# Patient Record
Sex: MALE | Race: WHITE | Employment: FULL TIME | URBAN - METROPOLITAN AREA
[De-identification: names, ages, dates, MRNs, and addresses within clinical notes are randomized per-mention and may not be internally consistent; named-entity substitution may affect disease eponyms.]

---

## 2017-09-14 ENCOUNTER — APPOINTMENT (OUTPATIENT)
Dept: GENERAL RADIOLOGY | Age: 52
DRG: 282 | End: 2017-09-14
Payer: COMMERCIAL

## 2017-09-14 ENCOUNTER — HOSPITAL ENCOUNTER (INPATIENT)
Age: 52
LOS: 1 days | Discharge: ANOTHER HOSPITAL | DRG: 282 | End: 2017-09-15
Attending: EMERGENCY MEDICINE | Admitting: INTERNAL MEDICINE
Payer: COMMERCIAL

## 2017-09-14 DIAGNOSIS — R07.9 CHEST PAIN, UNSPECIFIED TYPE: Primary | ICD-10-CM

## 2017-09-14 LAB
ABSOLUTE EOS #: 0.1 K/UL (ref 0–0.4)
ABSOLUTE LYMPH #: 2.5 K/UL (ref 1–4.8)
ABSOLUTE MONO #: 0.4 K/UL (ref 0.1–1.3)
ANION GAP SERPL CALCULATED.3IONS-SCNC: 15 MMOL/L (ref 9–17)
BASOPHILS # BLD: 1 %
BASOPHILS ABSOLUTE: 0 K/UL (ref 0–0.2)
BUN BLDV-MCNC: 16 MG/DL (ref 6–20)
BUN/CREAT BLD: ABNORMAL (ref 9–20)
CALCIUM SERPL-MCNC: 10 MG/DL (ref 8.6–10.4)
CHLORIDE BLD-SCNC: 102 MMOL/L (ref 98–107)
CO2: 26 MMOL/L (ref 20–31)
CREAT SERPL-MCNC: 0.96 MG/DL (ref 0.7–1.2)
DIFFERENTIAL TYPE: NORMAL
EOSINOPHILS RELATIVE PERCENT: 1 %
GFR AFRICAN AMERICAN: >60 ML/MIN
GFR NON-AFRICAN AMERICAN: >60 ML/MIN
GFR SERPL CREATININE-BSD FRML MDRD: ABNORMAL ML/MIN/{1.73_M2}
GFR SERPL CREATININE-BSD FRML MDRD: ABNORMAL ML/MIN/{1.73_M2}
GLUCOSE BLD-MCNC: 102 MG/DL (ref 70–99)
HCT VFR BLD CALC: 47.1 % (ref 41–53)
HEMOGLOBIN: 16 G/DL (ref 13.5–17.5)
INR BLD: 1
LYMPHOCYTES # BLD: 30 %
MCH RBC QN AUTO: 30.3 PG (ref 26–34)
MCHC RBC AUTO-ENTMCNC: 34 G/DL (ref 31–37)
MCV RBC AUTO: 89.2 FL (ref 80–100)
MONOCYTES # BLD: 5 %
PDW BLD-RTO: 13.2 % (ref 11.5–14.9)
PLATELET # BLD: 205 K/UL (ref 150–450)
PLATELET ESTIMATE: NORMAL
PMV BLD AUTO: 8.7 FL (ref 6–12)
POTASSIUM SERPL-SCNC: 3.9 MMOL/L (ref 3.7–5.3)
PROTHROMBIN TIME: 10.5 SEC (ref 9.7–12)
RBC # BLD: 5.28 M/UL (ref 4.5–5.9)
RBC # BLD: NORMAL 10*6/UL
SEG NEUTROPHILS: 63 %
SEGMENTED NEUTROPHILS ABSOLUTE COUNT: 5.4 K/UL (ref 1.3–9.1)
SODIUM BLD-SCNC: 143 MMOL/L (ref 135–144)
TROPONIN INTERP: NORMAL
TROPONIN T: <0.03 NG/ML
WBC # BLD: 8.5 K/UL (ref 3.5–11)
WBC # BLD: NORMAL 10*3/UL

## 2017-09-14 PROCEDURE — 80048 BASIC METABOLIC PNL TOTAL CA: CPT

## 2017-09-14 PROCEDURE — 71010 XR CHEST PORTABLE: CPT

## 2017-09-14 PROCEDURE — 85025 COMPLETE CBC W/AUTO DIFF WBC: CPT

## 2017-09-14 PROCEDURE — 36415 COLL VENOUS BLD VENIPUNCTURE: CPT

## 2017-09-14 PROCEDURE — 84484 ASSAY OF TROPONIN QUANT: CPT

## 2017-09-14 PROCEDURE — 99285 EMERGENCY DEPT VISIT HI MDM: CPT

## 2017-09-14 PROCEDURE — 6370000000 HC RX 637 (ALT 250 FOR IP): Performed by: EMERGENCY MEDICINE

## 2017-09-14 PROCEDURE — 85610 PROTHROMBIN TIME: CPT

## 2017-09-14 PROCEDURE — 93005 ELECTROCARDIOGRAM TRACING: CPT

## 2017-09-14 RX ORDER — ASPIRIN 81 MG/1
324 TABLET, CHEWABLE ORAL ONCE
Status: COMPLETED | OUTPATIENT
Start: 2017-09-14 | End: 2017-09-14

## 2017-09-14 RX ADMIN — ASPIRIN 81 MG 324 MG: 81 TABLET ORAL at 23:22

## 2017-09-14 ASSESSMENT — PAIN DESCRIPTION - PAIN TYPE: TYPE: ACUTE PAIN

## 2017-09-14 ASSESSMENT — PAIN SCALES - GENERAL: PAINLEVEL_OUTOF10: 0

## 2017-09-14 ASSESSMENT — PAIN DESCRIPTION - FREQUENCY: FREQUENCY: INTERMITTENT

## 2017-09-14 ASSESSMENT — PAIN DESCRIPTION - DESCRIPTORS: DESCRIPTORS: DISCOMFORT

## 2017-09-14 ASSESSMENT — PAIN DESCRIPTION - LOCATION: LOCATION: CHEST

## 2017-09-14 ASSESSMENT — PAIN DESCRIPTION - ORIENTATION: ORIENTATION: ANTERIOR;MID

## 2017-09-15 ENCOUNTER — APPOINTMENT (OUTPATIENT)
Dept: NUCLEAR MEDICINE | Age: 52
DRG: 282 | End: 2017-09-15
Payer: COMMERCIAL

## 2017-09-15 ENCOUNTER — HOSPITAL ENCOUNTER (OUTPATIENT)
Dept: CARDIAC CATH/INVASIVE PROCEDURES | Age: 52
Discharge: HOME | End: 2017-09-15
Payer: COMMERCIAL

## 2017-09-15 VITALS
RESPIRATION RATE: 17 BRPM | OXYGEN SATURATION: 96 % | BODY MASS INDEX: 29.54 KG/M2 | DIASTOLIC BLOOD PRESSURE: 67 MMHG | TEMPERATURE: 98.2 F | SYSTOLIC BLOOD PRESSURE: 103 MMHG | WEIGHT: 211 LBS | HEIGHT: 71 IN | HEART RATE: 53 BPM

## 2017-09-15 VITALS
DIASTOLIC BLOOD PRESSURE: 86 MMHG | OXYGEN SATURATION: 98 % | BODY MASS INDEX: 29.54 KG/M2 | SYSTOLIC BLOOD PRESSURE: 145 MMHG | HEART RATE: 57 BPM | TEMPERATURE: 97.7 F | HEIGHT: 71 IN | WEIGHT: 211 LBS | RESPIRATION RATE: 18 BRPM

## 2017-09-15 PROBLEM — R07.9 CHEST PAIN: Status: ACTIVE | Noted: 2017-09-15

## 2017-09-15 LAB
ABSOLUTE EOS #: 0.2 K/UL (ref 0–0.4)
ABSOLUTE LYMPH #: 2.9 K/UL (ref 1–4.8)
ABSOLUTE MONO #: 0.5 K/UL (ref 0.1–1.3)
ANION GAP SERPL CALCULATED.3IONS-SCNC: 10 MMOL/L (ref 9–17)
BASOPHILS # BLD: 1 %
BASOPHILS ABSOLUTE: 0.1 K/UL (ref 0–0.2)
BNP INTERPRETATION: NORMAL
BUN BLDV-MCNC: 19 MG/DL (ref 6–20)
BUN/CREAT BLD: ABNORMAL (ref 9–20)
CALCIUM SERPL-MCNC: 9.1 MG/DL (ref 8.6–10.4)
CHLORIDE BLD-SCNC: 106 MMOL/L (ref 98–107)
CHOLESTEROL/HDL RATIO: 4.8
CHOLESTEROL: 173 MG/DL
CO2: 27 MMOL/L (ref 20–31)
CREAT SERPL-MCNC: 0.97 MG/DL (ref 0.7–1.2)
DIFFERENTIAL TYPE: NORMAL
EOSINOPHILS RELATIVE PERCENT: 3 %
ESTIMATED AVERAGE GLUCOSE: 103 MG/DL
GFR AFRICAN AMERICAN: >60 ML/MIN
GFR NON-AFRICAN AMERICAN: >60 ML/MIN
GFR SERPL CREATININE-BSD FRML MDRD: ABNORMAL ML/MIN/{1.73_M2}
GFR SERPL CREATININE-BSD FRML MDRD: ABNORMAL ML/MIN/{1.73_M2}
GLUCOSE BLD-MCNC: 106 MG/DL (ref 70–99)
HBA1C MFR BLD: 5.2 % (ref 4–6)
HCT VFR BLD CALC: 43.1 % (ref 41–53)
HDLC SERPL-MCNC: 36 MG/DL
HEMOGLOBIN: 14.4 G/DL (ref 13.5–17.5)
LDL CHOLESTEROL: 75 MG/DL (ref 0–130)
LV EF: 44 %
LV EF: 45 %
LVEF MODALITY: NORMAL
LVEF MODALITY: NORMAL
LYMPHOCYTES # BLD: 38 %
MAGNESIUM: 2.3 MG/DL (ref 1.6–2.6)
MCH RBC QN AUTO: 29.8 PG (ref 26–34)
MCHC RBC AUTO-ENTMCNC: 33.4 G/DL (ref 31–37)
MCV RBC AUTO: 89.5 FL (ref 80–100)
MONOCYTES # BLD: 7 %
PDW BLD-RTO: 13.3 % (ref 11.5–14.9)
PLATELET # BLD: 170 K/UL (ref 150–450)
PLATELET ESTIMATE: NORMAL
PMV BLD AUTO: 8.4 FL (ref 6–12)
POTASSIUM SERPL-SCNC: 4.2 MMOL/L (ref 3.7–5.3)
PRO-BNP: 51 PG/ML
RBC # BLD: 4.82 M/UL (ref 4.5–5.9)
RBC # BLD: NORMAL 10*6/UL
SEG NEUTROPHILS: 51 %
SEGMENTED NEUTROPHILS ABSOLUTE COUNT: 3.8 K/UL (ref 1.3–9.1)
SODIUM BLD-SCNC: 143 MMOL/L (ref 135–144)
TRIGL SERPL-MCNC: 311 MG/DL
TROPONIN INTERP: NORMAL
TROPONIN T: <0.03 NG/ML
TSH SERPL DL<=0.05 MIU/L-ACNC: 3.01 MIU/L (ref 0.3–5)
VLDLC SERPL CALC-MCNC: ABNORMAL MG/DL (ref 1–30)
WBC # BLD: 7.5 K/UL (ref 3.5–11)
WBC # BLD: NORMAL 10*3/UL

## 2017-09-15 PROCEDURE — 84484 ASSAY OF TROPONIN QUANT: CPT

## 2017-09-15 PROCEDURE — 6360000002 HC RX W HCPCS: Performed by: NURSE PRACTITIONER

## 2017-09-15 PROCEDURE — 80048 BASIC METABOLIC PNL TOTAL CA: CPT

## 2017-09-15 PROCEDURE — 83735 ASSAY OF MAGNESIUM: CPT

## 2017-09-15 PROCEDURE — 2709999900 HC NON-CHARGEABLE SUPPLY

## 2017-09-15 PROCEDURE — G0378 HOSPITAL OBSERVATION PER HR: HCPCS

## 2017-09-15 PROCEDURE — 2500000003 HC RX 250 WO HCPCS

## 2017-09-15 PROCEDURE — 84443 ASSAY THYROID STIM HORMONE: CPT

## 2017-09-15 PROCEDURE — 83880 ASSAY OF NATRIURETIC PEPTIDE: CPT

## 2017-09-15 PROCEDURE — 93017 CV STRESS TEST TRACING ONLY: CPT

## 2017-09-15 PROCEDURE — C1894 INTRO/SHEATH, NON-LASER: HCPCS

## 2017-09-15 PROCEDURE — 6370000000 HC RX 637 (ALT 250 FOR IP): Performed by: INTERNAL MEDICINE

## 2017-09-15 PROCEDURE — 2580000003 HC RX 258: Performed by: NURSE PRACTITIONER

## 2017-09-15 PROCEDURE — 78452 HT MUSCLE IMAGE SPECT MULT: CPT

## 2017-09-15 PROCEDURE — 85025 COMPLETE CBC W/AUTO DIFF WBC: CPT

## 2017-09-15 PROCEDURE — C1887 CATHETER, GUIDING: HCPCS

## 2017-09-15 PROCEDURE — 7100000010 HC PHASE II RECOVERY - FIRST 15 MIN

## 2017-09-15 PROCEDURE — A9500 TC99M SESTAMIBI: HCPCS | Performed by: INTERNAL MEDICINE

## 2017-09-15 PROCEDURE — 93458 L HRT ARTERY/VENTRICLE ANGIO: CPT | Performed by: INTERNAL MEDICINE

## 2017-09-15 PROCEDURE — 36415 COLL VENOUS BLD VENIPUNCTURE: CPT

## 2017-09-15 PROCEDURE — 6370000000 HC RX 637 (ALT 250 FOR IP): Performed by: NURSE PRACTITIONER

## 2017-09-15 PROCEDURE — 99220 PR INITIAL OBSERVATION CARE/DAY 70 MINUTES: CPT | Performed by: INTERNAL MEDICINE

## 2017-09-15 PROCEDURE — 93306 TTE W/DOPPLER COMPLETE: CPT

## 2017-09-15 PROCEDURE — 6360000002 HC RX W HCPCS

## 2017-09-15 PROCEDURE — 3430000000 HC RX DIAGNOSTIC RADIOPHARMACEUTICAL: Performed by: INTERNAL MEDICINE

## 2017-09-15 PROCEDURE — 1200000000 HC SEMI PRIVATE

## 2017-09-15 PROCEDURE — A9500 TC99M SESTAMIBI: HCPCS | Performed by: NURSE PRACTITIONER

## 2017-09-15 PROCEDURE — 83036 HEMOGLOBIN GLYCOSYLATED A1C: CPT

## 2017-09-15 PROCEDURE — 80061 LIPID PANEL: CPT

## 2017-09-15 PROCEDURE — C1769 GUIDE WIRE: HCPCS

## 2017-09-15 PROCEDURE — 7100000011 HC PHASE II RECOVERY - ADDTL 15 MIN

## 2017-09-15 PROCEDURE — 3430000000 HC RX DIAGNOSTIC RADIOPHARMACEUTICAL: Performed by: NURSE PRACTITIONER

## 2017-09-15 RX ORDER — SODIUM CHLORIDE 0.9 % (FLUSH) 0.9 %
10 SYRINGE (ML) INJECTION PRN
Status: DISCONTINUED | OUTPATIENT
Start: 2017-09-15 | End: 2017-09-15 | Stop reason: HOSPADM

## 2017-09-15 RX ORDER — DEXTROSE, SODIUM CHLORIDE, AND POTASSIUM CHLORIDE 5; .45; .15 G/100ML; G/100ML; G/100ML
INJECTION INTRAVENOUS CONTINUOUS
Status: DISCONTINUED | OUTPATIENT
Start: 2017-09-15 | End: 2017-09-15 | Stop reason: HOSPADM

## 2017-09-15 RX ORDER — ASPIRIN 81 MG/1
81 TABLET, CHEWABLE ORAL DAILY
Status: DISCONTINUED | OUTPATIENT
Start: 2017-09-15 | End: 2017-09-15 | Stop reason: HOSPADM

## 2017-09-15 RX ORDER — ACETAMINOPHEN 325 MG/1
650 TABLET ORAL EVERY 4 HOURS PRN
Status: DISCONTINUED | OUTPATIENT
Start: 2017-09-15 | End: 2017-09-16 | Stop reason: HOSPADM

## 2017-09-15 RX ORDER — SODIUM CHLORIDE 0.9 % (FLUSH) 0.9 %
10 SYRINGE (ML) INJECTION EVERY 12 HOURS SCHEDULED
Status: DISCONTINUED | OUTPATIENT
Start: 2017-09-15 | End: 2017-09-16 | Stop reason: HOSPADM

## 2017-09-15 RX ORDER — NITROGLYCERIN 0.4 MG/1
0.4 TABLET SUBLINGUAL EVERY 5 MIN PRN
Status: DISCONTINUED | OUTPATIENT
Start: 2017-09-15 | End: 2017-09-15 | Stop reason: HOSPADM

## 2017-09-15 RX ORDER — AMINOPHYLLINE DIHYDRATE 25 MG/ML
100 INJECTION, SOLUTION INTRAVENOUS
Status: DISCONTINUED | OUTPATIENT
Start: 2017-09-15 | End: 2017-09-15 | Stop reason: HOSPADM

## 2017-09-15 RX ORDER — FAMOTIDINE 20 MG/1
20 TABLET, FILM COATED ORAL 2 TIMES DAILY
Status: DISCONTINUED | OUTPATIENT
Start: 2017-09-15 | End: 2017-09-15 | Stop reason: HOSPADM

## 2017-09-15 RX ORDER — ATORVASTATIN CALCIUM 80 MG/1
80 TABLET, FILM COATED ORAL NIGHTLY
Status: DISCONTINUED | OUTPATIENT
Start: 2017-09-15 | End: 2017-09-15 | Stop reason: HOSPADM

## 2017-09-15 RX ORDER — POTASSIUM CHLORIDE 20MEQ/15ML
40 LIQUID (ML) ORAL PRN
Status: DISCONTINUED | OUTPATIENT
Start: 2017-09-15 | End: 2017-09-15 | Stop reason: HOSPADM

## 2017-09-15 RX ORDER — OMEGA-3-ACID ETHYL ESTERS 1 G/1
2 CAPSULE, LIQUID FILLED ORAL 2 TIMES DAILY
Status: DISCONTINUED | OUTPATIENT
Start: 2017-09-15 | End: 2017-09-15 | Stop reason: HOSPADM

## 2017-09-15 RX ORDER — ONDANSETRON 2 MG/ML
4 INJECTION INTRAMUSCULAR; INTRAVENOUS EVERY 6 HOURS PRN
Status: DISCONTINUED | OUTPATIENT
Start: 2017-09-15 | End: 2017-09-15 | Stop reason: HOSPADM

## 2017-09-15 RX ORDER — SODIUM CHLORIDE 0.9 % (FLUSH) 0.9 %
10 SYRINGE (ML) INJECTION PRN
Status: DISCONTINUED | OUTPATIENT
Start: 2017-09-15 | End: 2017-09-16 | Stop reason: HOSPADM

## 2017-09-15 RX ORDER — POTASSIUM CHLORIDE 7.45 MG/ML
10 INJECTION INTRAVENOUS PRN
Status: DISCONTINUED | OUTPATIENT
Start: 2017-09-15 | End: 2017-09-15 | Stop reason: HOSPADM

## 2017-09-15 RX ORDER — 0.9 % SODIUM CHLORIDE 0.9 %
250 INTRAVENOUS SOLUTION INTRAVENOUS ONCE
Status: DISCONTINUED | OUTPATIENT
Start: 2017-09-15 | End: 2017-09-15 | Stop reason: HOSPADM

## 2017-09-15 RX ORDER — SODIUM CHLORIDE 9 MG/ML
INJECTION, SOLUTION INTRAVENOUS CONTINUOUS
Status: DISCONTINUED | OUTPATIENT
Start: 2017-09-15 | End: 2017-09-16 | Stop reason: HOSPADM

## 2017-09-15 RX ORDER — ACETAMINOPHEN 325 MG/1
650 TABLET ORAL EVERY 4 HOURS PRN
Status: DISCONTINUED | OUTPATIENT
Start: 2017-09-15 | End: 2017-09-15 | Stop reason: HOSPADM

## 2017-09-15 RX ORDER — NITROGLYCERIN 0.4 MG/1
0.4 TABLET SUBLINGUAL EVERY 5 MIN PRN
Status: DISCONTINUED | OUTPATIENT
Start: 2017-09-15 | End: 2017-09-15 | Stop reason: SDUPTHER

## 2017-09-15 RX ORDER — METOPROLOL TARTRATE 5 MG/5ML
2.5 INJECTION INTRAVENOUS PRN
Status: DISCONTINUED | OUTPATIENT
Start: 2017-09-15 | End: 2017-09-15 | Stop reason: HOSPADM

## 2017-09-15 RX ORDER — MAGNESIUM SULFATE 1 G/100ML
1 INJECTION INTRAVENOUS PRN
Status: DISCONTINUED | OUTPATIENT
Start: 2017-09-15 | End: 2017-09-15 | Stop reason: HOSPADM

## 2017-09-15 RX ORDER — SODIUM CHLORIDE 0.9 % (FLUSH) 0.9 %
10 SYRINGE (ML) INJECTION EVERY 12 HOURS SCHEDULED
Status: DISCONTINUED | OUTPATIENT
Start: 2017-09-15 | End: 2017-09-15 | Stop reason: HOSPADM

## 2017-09-15 RX ORDER — POTASSIUM CHLORIDE 20 MEQ/1
40 TABLET, EXTENDED RELEASE ORAL PRN
Status: DISCONTINUED | OUTPATIENT
Start: 2017-09-15 | End: 2017-09-15 | Stop reason: HOSPADM

## 2017-09-15 RX ADMIN — TETRAKIS(2-METHOXYISOBUTYLISOCYANIDE)COPPER(I) TETRAFLUOROBORATE 12.48 MILLICURIE: 1 INJECTION, POWDER, LYOPHILIZED, FOR SOLUTION INTRAVENOUS at 07:30

## 2017-09-15 RX ADMIN — FAMOTIDINE 20 MG: 20 TABLET ORAL at 11:55

## 2017-09-15 RX ADMIN — TICAGRELOR 90 MG: 90 TABLET ORAL at 02:52

## 2017-09-15 RX ADMIN — SODIUM CHLORIDE: 9 INJECTION, SOLUTION INTRAVENOUS at 15:41

## 2017-09-15 RX ADMIN — FAMOTIDINE 20 MG: 20 TABLET ORAL at 02:05

## 2017-09-15 RX ADMIN — Medication 10 ML: at 09:05

## 2017-09-15 RX ADMIN — OMEGA-3-ACID ETHYL ESTERS 2 G: 1 CAPSULE, LIQUID FILLED ORAL at 11:55

## 2017-09-15 RX ADMIN — ASPIRIN 81 MG 81 MG: 81 TABLET ORAL at 11:55

## 2017-09-15 RX ADMIN — Medication 10 ML: at 07:31

## 2017-09-15 RX ADMIN — TETRAKIS(2-METHOXYISOBUTYLISOCYANIDE)COPPER(I) TETRAFLUOROBORATE 35.5 MILLICURIE: 1 INJECTION, POWDER, LYOPHILIZED, FOR SOLUTION INTRAVENOUS at 09:35

## 2017-09-15 RX ADMIN — Medication 10 ML: at 07:30

## 2017-09-15 RX ADMIN — REGADENOSON 0.4 MG: 0.08 INJECTION, SOLUTION INTRAVENOUS at 09:05

## 2017-09-15 RX ADMIN — POTASSIUM CHLORIDE, DEXTROSE MONOHYDRATE AND SODIUM CHLORIDE: 150; 5; 450 INJECTION, SOLUTION INTRAVENOUS at 02:08

## 2017-09-15 RX ADMIN — METOPROLOL TARTRATE 25 MG: 25 TABLET ORAL at 11:55

## 2017-09-15 RX ADMIN — OMEGA-3-ACID ETHYL ESTERS 2 G: 1 CAPSULE, LIQUID FILLED ORAL at 02:52

## 2017-09-15 ASSESSMENT — ENCOUNTER SYMPTOMS
BACK PAIN: 0
SORE THROAT: 0
VOMITING: 0
SPUTUM PRODUCTION: 0
DOUBLE VISION: 0
SHORTNESS OF BREATH: 0
EYE DISCHARGE: 0
BLURRED VISION: 0
NAUSEA: 0
ORTHOPNEA: 0
WHEEZING: 0
COLOR CHANGE: 0
RHINORRHEA: 0
CONSTIPATION: 0
COUGH: 0
EYE REDNESS: 0
DIARRHEA: 0
ABDOMINAL PAIN: 0

## 2017-09-15 ASSESSMENT — PAIN SCALES - GENERAL: PAINLEVEL_OUTOF10: 0

## 2017-09-18 LAB
EKG ATRIAL RATE: 78 BPM
EKG P AXIS: 40 DEGREES
EKG P-R INTERVAL: 136 MS
EKG Q-T INTERVAL: 396 MS
EKG QRS DURATION: 96 MS
EKG QTC CALCULATION (BAZETT): 451 MS
EKG R AXIS: 49 DEGREES
EKG T AXIS: 40 DEGREES
EKG VENTRICULAR RATE: 78 BPM

## 2018-01-15 ENCOUNTER — HOSPITAL ENCOUNTER (OUTPATIENT)
Age: 53
Discharge: HOME | End: 2018-01-15
Payer: COMMERCIAL

## 2018-01-15 LAB
DIRECT EXAM: NORMAL
Lab: NORMAL
SPECIMEN DESCRIPTION: NORMAL
SPECIMEN DESCRIPTION: NORMAL
STATUS: NORMAL

## 2018-01-15 PROCEDURE — 87804 INFLUENZA ASSAY W/OPTIC: CPT

## 2018-05-24 ENCOUNTER — HOSPITAL ENCOUNTER (OUTPATIENT)
Dept: GENERAL RADIOLOGY | Age: 53
Discharge: HOME | End: 2018-05-26
Payer: COMMERCIAL

## 2018-05-24 ENCOUNTER — HOSPITAL ENCOUNTER (OUTPATIENT)
Age: 53
Discharge: HOME | End: 2018-05-26
Payer: COMMERCIAL

## 2018-05-24 DIAGNOSIS — M51.9 LUMBAR DISC DISORDER: ICD-10-CM

## 2018-05-24 PROCEDURE — 72100 X-RAY EXAM L-S SPINE 2/3 VWS: CPT

## 2020-09-23 ENCOUNTER — HOSPITAL ENCOUNTER (OUTPATIENT)
Dept: GENERAL RADIOLOGY | Age: 55
Discharge: HOME | End: 2020-09-25
Payer: COMMERCIAL

## 2020-09-23 ENCOUNTER — HOSPITAL ENCOUNTER (OUTPATIENT)
Age: 55
Discharge: HOME | End: 2020-09-23
Payer: COMMERCIAL

## 2020-09-23 ENCOUNTER — HOSPITAL ENCOUNTER (OUTPATIENT)
Age: 55
Discharge: HOME | End: 2020-09-25
Payer: COMMERCIAL

## 2020-09-23 LAB
ANION GAP SERPL CALCULATED.3IONS-SCNC: 8 MMOL/L (ref 9–17)
BUN BLDV-MCNC: 15 MG/DL (ref 6–20)
BUN/CREAT BLD: ABNORMAL (ref 9–20)
CALCIUM SERPL-MCNC: 9.5 MG/DL (ref 8.6–10.4)
CHLORIDE BLD-SCNC: 106 MMOL/L (ref 98–107)
CHOLESTEROL, FASTING: 144 MG/DL
CHOLESTEROL/HDL RATIO: 3.5
CO2: 25 MMOL/L (ref 20–31)
CREAT SERPL-MCNC: 0.86 MG/DL (ref 0.7–1.2)
GFR AFRICAN AMERICAN: >60 ML/MIN
GFR NON-AFRICAN AMERICAN: >60 ML/MIN
GFR SERPL CREATININE-BSD FRML MDRD: ABNORMAL ML/MIN/{1.73_M2}
GFR SERPL CREATININE-BSD FRML MDRD: ABNORMAL ML/MIN/{1.73_M2}
GLUCOSE BLD-MCNC: 104 MG/DL (ref 70–99)
HCT VFR BLD CALC: 44.8 % (ref 41–53)
HDLC SERPL-MCNC: 41 MG/DL
HEMOGLOBIN: 15.4 G/DL (ref 13.5–17.5)
LDL CHOLESTEROL: 75 MG/DL (ref 0–130)
MCH RBC QN AUTO: 29.7 PG (ref 26–34)
MCHC RBC AUTO-ENTMCNC: 34.3 G/DL (ref 31–37)
MCV RBC AUTO: 86.8 FL (ref 80–100)
NRBC AUTOMATED: NORMAL PER 100 WBC
PDW BLD-RTO: 13.4 % (ref 11.5–14.9)
PLATELET # BLD: 201 K/UL (ref 150–450)
PMV BLD AUTO: 8 FL (ref 6–12)
POTASSIUM SERPL-SCNC: 4.4 MMOL/L (ref 3.7–5.3)
PROSTATE SPECIFIC ANTIGEN: 1.61 UG/L
RBC # BLD: 5.16 M/UL (ref 4.5–5.9)
SODIUM BLD-SCNC: 139 MMOL/L (ref 135–144)
THYROXINE, FREE: 0.93 NG/DL (ref 0.93–1.7)
TRIGLYCERIDE, FASTING: 140 MG/DL
TSH SERPL DL<=0.05 MIU/L-ACNC: 1.48 MIU/L (ref 0.3–5)
VLDLC SERPL CALC-MCNC: NORMAL MG/DL (ref 1–30)
WBC # BLD: 5.7 K/UL (ref 3.5–11)

## 2020-09-23 PROCEDURE — 85027 COMPLETE CBC AUTOMATED: CPT

## 2020-09-23 PROCEDURE — 36415 COLL VENOUS BLD VENIPUNCTURE: CPT

## 2020-09-23 PROCEDURE — 73630 X-RAY EXAM OF FOOT: CPT

## 2020-09-23 PROCEDURE — 84443 ASSAY THYROID STIM HORMONE: CPT

## 2020-09-23 PROCEDURE — 84439 ASSAY OF FREE THYROXINE: CPT

## 2020-09-23 PROCEDURE — 80061 LIPID PANEL: CPT

## 2020-09-23 PROCEDURE — 80048 BASIC METABOLIC PNL TOTAL CA: CPT

## 2020-09-23 PROCEDURE — G0103 PSA SCREENING: HCPCS

## 2020-11-23 ENCOUNTER — HOSPITAL ENCOUNTER (OUTPATIENT)
Age: 55
Setting detail: OBSERVATION
Discharge: HOME | End: 2020-11-25
Attending: EMERGENCY MEDICINE | Admitting: INTERNAL MEDICINE
Payer: COMMERCIAL

## 2020-11-23 ENCOUNTER — APPOINTMENT (OUTPATIENT)
Dept: GENERAL RADIOLOGY | Age: 55
End: 2020-11-23
Payer: COMMERCIAL

## 2020-11-23 LAB
ABSOLUTE EOS #: 0.1 K/UL (ref 0–0.4)
ABSOLUTE IMMATURE GRANULOCYTE: ABNORMAL K/UL (ref 0–0.3)
ABSOLUTE LYMPH #: 2.3 K/UL (ref 1–4.8)
ABSOLUTE MONO #: 0.7 K/UL (ref 0.1–1.3)
ANION GAP SERPL CALCULATED.3IONS-SCNC: 9 MMOL/L (ref 9–17)
BASOPHILS # BLD: 2 % (ref 0–2)
BASOPHILS ABSOLUTE: 0.1 K/UL (ref 0–0.2)
BUN BLDV-MCNC: 13 MG/DL (ref 6–20)
BUN/CREAT BLD: NORMAL (ref 9–20)
CALCIUM SERPL-MCNC: 9 MG/DL (ref 8.6–10.4)
CHLORIDE BLD-SCNC: 103 MMOL/L (ref 98–107)
CO2: 26 MMOL/L (ref 20–31)
CREAT SERPL-MCNC: 0.78 MG/DL (ref 0.7–1.2)
DIFFERENTIAL TYPE: ABNORMAL
EOSINOPHILS RELATIVE PERCENT: 1 % (ref 0–4)
GFR AFRICAN AMERICAN: >60 ML/MIN
GFR NON-AFRICAN AMERICAN: >60 ML/MIN
GFR SERPL CREATININE-BSD FRML MDRD: NORMAL ML/MIN/{1.73_M2}
GFR SERPL CREATININE-BSD FRML MDRD: NORMAL ML/MIN/{1.73_M2}
GLUCOSE BLD-MCNC: 95 MG/DL (ref 70–99)
HCT VFR BLD CALC: 42.7 % (ref 41–53)
HEMOGLOBIN: 15 G/DL (ref 13.5–17.5)
IMMATURE GRANULOCYTES: ABNORMAL %
LYMPHOCYTES # BLD: 26 % (ref 24–44)
MCH RBC QN AUTO: 29.6 PG (ref 26–34)
MCHC RBC AUTO-ENTMCNC: 35.2 G/DL (ref 31–37)
MCV RBC AUTO: 84.2 FL (ref 80–100)
MONOCYTES # BLD: 8 % (ref 1–7)
NRBC AUTOMATED: ABNORMAL PER 100 WBC
PDW BLD-RTO: 13.2 % (ref 11.5–14.9)
PLATELET # BLD: 232 K/UL (ref 150–450)
PLATELET ESTIMATE: ABNORMAL
PMV BLD AUTO: 7.8 FL (ref 6–12)
POTASSIUM SERPL-SCNC: 3.7 MMOL/L (ref 3.7–5.3)
RBC # BLD: 5.07 M/UL (ref 4.5–5.9)
RBC # BLD: ABNORMAL 10*6/UL
SEG NEUTROPHILS: 63 % (ref 36–66)
SEGMENTED NEUTROPHILS ABSOLUTE COUNT: 5.4 K/UL (ref 1.3–9.1)
SODIUM BLD-SCNC: 138 MMOL/L (ref 135–144)
TROPONIN INTERP: NORMAL
TROPONIN T: NORMAL NG/ML
TROPONIN, HIGH SENSITIVITY: 8 NG/L (ref 0–22)
WBC # BLD: 8.6 K/UL (ref 3.5–11)
WBC # BLD: ABNORMAL 10*3/UL

## 2020-11-23 PROCEDURE — 71045 X-RAY EXAM CHEST 1 VIEW: CPT

## 2020-11-23 PROCEDURE — 80048 BASIC METABOLIC PNL TOTAL CA: CPT

## 2020-11-23 PROCEDURE — 93005 ELECTROCARDIOGRAM TRACING: CPT | Performed by: EMERGENCY MEDICINE

## 2020-11-23 PROCEDURE — 99285 EMERGENCY DEPT VISIT HI MDM: CPT

## 2020-11-23 PROCEDURE — 36415 COLL VENOUS BLD VENIPUNCTURE: CPT

## 2020-11-23 PROCEDURE — 84484 ASSAY OF TROPONIN QUANT: CPT

## 2020-11-23 PROCEDURE — G0378 HOSPITAL OBSERVATION PER HR: HCPCS

## 2020-11-23 PROCEDURE — 6370000000 HC RX 637 (ALT 250 FOR IP): Performed by: EMERGENCY MEDICINE

## 2020-11-23 PROCEDURE — 85025 COMPLETE CBC W/AUTO DIFF WBC: CPT

## 2020-11-23 RX ORDER — NITROGLYCERIN 0.4 MG/1
0.4 TABLET SUBLINGUAL ONCE
Status: COMPLETED | OUTPATIENT
Start: 2020-11-23 | End: 2020-11-23

## 2020-11-23 RX ORDER — ASPIRIN 81 MG/1
324 TABLET, CHEWABLE ORAL ONCE
Status: COMPLETED | OUTPATIENT
Start: 2020-11-23 | End: 2020-11-23

## 2020-11-23 RX ADMIN — ASPIRIN 324 MG: 81 TABLET, CHEWABLE ORAL at 21:45

## 2020-11-23 RX ADMIN — NITROGLYCERIN 0.4 MG: 0.4 TABLET, ORALLY DISINTEGRATING SUBLINGUAL at 21:45

## 2020-11-23 ASSESSMENT — ENCOUNTER SYMPTOMS
ABDOMINAL PAIN: 0
CHEST TIGHTNESS: 1
VOMITING: 0
NAUSEA: 0
SHORTNESS OF BREATH: 1
CONSTIPATION: 0
SORE THROAT: 0
DIARRHEA: 0

## 2020-11-23 ASSESSMENT — PAIN DESCRIPTION - DESCRIPTORS: DESCRIPTORS: DISCOMFORT

## 2020-11-23 ASSESSMENT — PAIN DESCRIPTION - PAIN TYPE: TYPE: ACUTE PAIN

## 2020-11-23 ASSESSMENT — PAIN SCALES - GENERAL: PAINLEVEL_OUTOF10: 3

## 2020-11-23 ASSESSMENT — PAIN DESCRIPTION - LOCATION: LOCATION: CHEST

## 2020-11-23 ASSESSMENT — HEART SCORE: ECG: 0

## 2020-11-24 ENCOUNTER — APPOINTMENT (OUTPATIENT)
Dept: NUCLEAR MEDICINE | Age: 55
End: 2020-11-24
Payer: COMMERCIAL

## 2020-11-24 PROBLEM — R07.89 CHEST HEAVINESS: Status: ACTIVE | Noted: 2017-09-15

## 2020-11-24 LAB
BNP INTERPRETATION: NORMAL
CHOLESTEROL/HDL RATIO: 4.8
CHOLESTEROL: 162 MG/DL
D-DIMER QUANTITATIVE: 0.29 MG/L FEU (ref 0–0.59)
ESTIMATED AVERAGE GLUCOSE: 128 MG/DL
HBA1C MFR BLD: 6.1 % (ref 4–6)
HDLC SERPL-MCNC: 34 MG/DL
LDL CHOLESTEROL: 85 MG/DL (ref 0–130)
LV EF: 50 %
LV EF: 53 %
LVEF MODALITY: NORMAL
LVEF MODALITY: NORMAL
MAGNESIUM: 2.2 MG/DL (ref 1.6–2.6)
PRO-BNP: 48 PG/ML
TRIGL SERPL-MCNC: 214 MG/DL
TROPONIN INTERP: NORMAL
TROPONIN T: NORMAL NG/ML
TROPONIN, HIGH SENSITIVITY: 7 NG/L (ref 0–22)
TSH SERPL DL<=0.05 MIU/L-ACNC: 2.04 MIU/L (ref 0.3–5)
VLDLC SERPL CALC-MCNC: ABNORMAL MG/DL (ref 1–30)

## 2020-11-24 PROCEDURE — G0378 HOSPITAL OBSERVATION PER HR: HCPCS

## 2020-11-24 PROCEDURE — 85379 FIBRIN DEGRADATION QUANT: CPT

## 2020-11-24 PROCEDURE — 2580000003 HC RX 258: Performed by: NURSE PRACTITIONER

## 2020-11-24 PROCEDURE — 80061 LIPID PANEL: CPT

## 2020-11-24 PROCEDURE — 99220 PR INITIAL OBSERVATION CARE/DAY 70 MINUTES: CPT | Performed by: INTERNAL MEDICINE

## 2020-11-24 PROCEDURE — 78452 HT MUSCLE IMAGE SPECT MULT: CPT

## 2020-11-24 PROCEDURE — A9500 TC99M SESTAMIBI: HCPCS | Performed by: NURSE PRACTITIONER

## 2020-11-24 PROCEDURE — 3430000000 HC RX DIAGNOSTIC RADIOPHARMACEUTICAL: Performed by: NURSE PRACTITIONER

## 2020-11-24 PROCEDURE — 83036 HEMOGLOBIN GLYCOSYLATED A1C: CPT

## 2020-11-24 PROCEDURE — 93017 CV STRESS TEST TRACING ONLY: CPT

## 2020-11-24 PROCEDURE — 6370000000 HC RX 637 (ALT 250 FOR IP): Performed by: NURSE PRACTITIONER

## 2020-11-24 PROCEDURE — 36415 COLL VENOUS BLD VENIPUNCTURE: CPT

## 2020-11-24 PROCEDURE — 93306 TTE W/DOPPLER COMPLETE: CPT

## 2020-11-24 PROCEDURE — 6370000000 HC RX 637 (ALT 250 FOR IP): Performed by: INTERNAL MEDICINE

## 2020-11-24 PROCEDURE — 83735 ASSAY OF MAGNESIUM: CPT

## 2020-11-24 PROCEDURE — 83880 ASSAY OF NATRIURETIC PEPTIDE: CPT

## 2020-11-24 PROCEDURE — 84443 ASSAY THYROID STIM HORMONE: CPT

## 2020-11-24 RX ORDER — POTASSIUM CHLORIDE 7.45 MG/ML
10 INJECTION INTRAVENOUS PRN
Status: DISCONTINUED | OUTPATIENT
Start: 2020-11-24 | End: 2020-11-25 | Stop reason: HOSPADM

## 2020-11-24 RX ORDER — LISINOPRIL 2.5 MG/1
2.5 TABLET ORAL
COMMUNITY

## 2020-11-24 RX ORDER — FOLIC ACID 1 MG/1
1 TABLET ORAL
Status: DISCONTINUED | OUTPATIENT
Start: 2020-11-24 | End: 2020-11-25 | Stop reason: HOSPADM

## 2020-11-24 RX ORDER — METOPROLOL TARTRATE 5 MG/5ML
5 INJECTION INTRAVENOUS EVERY 5 MIN PRN
Status: ACTIVE | OUTPATIENT
Start: 2020-11-24 | End: 2020-11-24

## 2020-11-24 RX ORDER — ATORVASTATIN CALCIUM 80 MG/1
80 TABLET, FILM COATED ORAL
Status: DISCONTINUED | OUTPATIENT
Start: 2020-11-24 | End: 2020-11-25 | Stop reason: HOSPADM

## 2020-11-24 RX ORDER — OMEGA-3-ACID ETHYL ESTERS 1 G/1
1 CAPSULE, LIQUID FILLED ORAL
Status: DISCONTINUED | OUTPATIENT
Start: 2020-11-24 | End: 2020-11-25 | Stop reason: HOSPADM

## 2020-11-24 RX ORDER — SODIUM CHLORIDE 0.9 % (FLUSH) 0.9 %
10 SYRINGE (ML) INJECTION PRN
Status: DISCONTINUED | OUTPATIENT
Start: 2020-11-24 | End: 2020-11-25 | Stop reason: HOSPADM

## 2020-11-24 RX ORDER — SODIUM CHLORIDE 0.9 % (FLUSH) 0.9 %
10 SYRINGE (ML) INJECTION PRN
Status: ACTIVE | OUTPATIENT
Start: 2020-11-24 | End: 2020-11-24

## 2020-11-24 RX ORDER — SODIUM CHLORIDE 9 MG/ML
500 INJECTION, SOLUTION INTRAVENOUS CONTINUOUS PRN
Status: ACTIVE | OUTPATIENT
Start: 2020-11-24 | End: 2020-11-24

## 2020-11-24 RX ORDER — ONDANSETRON 2 MG/ML
4 INJECTION INTRAMUSCULAR; INTRAVENOUS EVERY 6 HOURS PRN
Status: DISCONTINUED | OUTPATIENT
Start: 2020-11-24 | End: 2020-11-25 | Stop reason: HOSPADM

## 2020-11-24 RX ORDER — NITROGLYCERIN 0.4 MG/1
0.4 TABLET SUBLINGUAL EVERY 5 MIN PRN
Status: ACTIVE | OUTPATIENT
Start: 2020-11-24 | End: 2020-11-24

## 2020-11-24 RX ORDER — POTASSIUM CHLORIDE 20 MEQ/1
40 TABLET, EXTENDED RELEASE ORAL PRN
Status: DISCONTINUED | OUTPATIENT
Start: 2020-11-24 | End: 2020-11-25 | Stop reason: HOSPADM

## 2020-11-24 RX ORDER — PROMETHAZINE HYDROCHLORIDE 25 MG/1
12.5 TABLET ORAL EVERY 6 HOURS PRN
Status: DISCONTINUED | OUTPATIENT
Start: 2020-11-24 | End: 2020-11-25 | Stop reason: HOSPADM

## 2020-11-24 RX ORDER — FOLIC ACID 1 MG/1
1 TABLET ORAL
COMMUNITY

## 2020-11-24 RX ORDER — MAGNESIUM SULFATE 1 G/100ML
1 INJECTION INTRAVENOUS PRN
Status: DISCONTINUED | OUTPATIENT
Start: 2020-11-24 | End: 2020-11-25 | Stop reason: HOSPADM

## 2020-11-24 RX ORDER — ATROPINE SULFATE 0.1 MG/ML
0.5 INJECTION INTRAVENOUS EVERY 5 MIN PRN
Status: ACTIVE | OUTPATIENT
Start: 2020-11-24 | End: 2020-11-24

## 2020-11-24 RX ORDER — ACETAMINOPHEN 650 MG/1
650 SUPPOSITORY RECTAL EVERY 6 HOURS PRN
Status: DISCONTINUED | OUTPATIENT
Start: 2020-11-24 | End: 2020-11-25 | Stop reason: HOSPADM

## 2020-11-24 RX ORDER — ASPIRIN 81 MG/1
81 TABLET, CHEWABLE ORAL
Status: DISCONTINUED | OUTPATIENT
Start: 2020-11-24 | End: 2020-11-25 | Stop reason: HOSPADM

## 2020-11-24 RX ORDER — SODIUM CHLORIDE 9 MG/ML
INJECTION, SOLUTION INTRAVENOUS CONTINUOUS
Status: DISCONTINUED | OUTPATIENT
Start: 2020-11-24 | End: 2020-11-25 | Stop reason: HOSPADM

## 2020-11-24 RX ORDER — ACETAMINOPHEN 325 MG/1
650 TABLET ORAL EVERY 6 HOURS PRN
Status: DISCONTINUED | OUTPATIENT
Start: 2020-11-24 | End: 2020-11-25 | Stop reason: HOSPADM

## 2020-11-24 RX ORDER — ALBUTEROL SULFATE 90 UG/1
2 AEROSOL, METERED RESPIRATORY (INHALATION) PRN
Status: ACTIVE | OUTPATIENT
Start: 2020-11-24 | End: 2020-11-24

## 2020-11-24 RX ORDER — NITROGLYCERIN 0.4 MG/1
0.4 TABLET SUBLINGUAL EVERY 5 MIN PRN
Status: DISCONTINUED | OUTPATIENT
Start: 2020-11-24 | End: 2020-11-25 | Stop reason: HOSPADM

## 2020-11-24 RX ORDER — LISINOPRIL 5 MG/1
2.5 TABLET ORAL
Status: DISCONTINUED | OUTPATIENT
Start: 2020-11-24 | End: 2020-11-25 | Stop reason: HOSPADM

## 2020-11-24 RX ORDER — SODIUM CHLORIDE 0.9 % (FLUSH) 0.9 %
10 SYRINGE (ML) INJECTION EVERY 12 HOURS SCHEDULED
Status: DISCONTINUED | OUTPATIENT
Start: 2020-11-24 | End: 2020-11-25 | Stop reason: HOSPADM

## 2020-11-24 RX ADMIN — TETRAKIS(2-METHOXYISOBUTYLISOCYANIDE)COPPER(I) TETRAFLUOROBORATE 11.8 MILLICURIE: 1 INJECTION, POWDER, LYOPHILIZED, FOR SOLUTION INTRAVENOUS at 07:31

## 2020-11-24 RX ADMIN — ACETAMINOPHEN 650 MG: 325 TABLET, FILM COATED ORAL at 14:36

## 2020-11-24 RX ADMIN — Medication 10 ML: at 07:31

## 2020-11-24 RX ADMIN — Medication 10 ML: at 20:32

## 2020-11-24 RX ADMIN — OMEGA-3-ACID ETHYL ESTERS 1 G: 1 CAPSULE, LIQUID FILLED ORAL at 18:40

## 2020-11-24 RX ADMIN — METOPROLOL TARTRATE 25 MG: 25 TABLET, FILM COATED ORAL at 20:32

## 2020-11-24 RX ADMIN — FOLIC ACID 1 MG: 1 TABLET ORAL at 18:40

## 2020-11-24 RX ADMIN — ACETAMINOPHEN 650 MG: 325 TABLET, FILM COATED ORAL at 20:42

## 2020-11-24 RX ADMIN — LISINOPRIL 2.5 MG: 5 TABLET ORAL at 18:40

## 2020-11-24 RX ADMIN — ATORVASTATIN CALCIUM 80 MG: 80 TABLET, FILM COATED ORAL at 18:40

## 2020-11-24 RX ADMIN — TICAGRELOR 60 MG: 60 TABLET ORAL at 18:41

## 2020-11-24 RX ADMIN — TETRAKIS(2-METHOXYISOBUTYLISOCYANIDE)COPPER(I) TETRAFLUOROBORATE 37 MILLICURIE: 1 INJECTION, POWDER, LYOPHILIZED, FOR SOLUTION INTRAVENOUS at 10:45

## 2020-11-24 RX ADMIN — SODIUM CHLORIDE: 9 INJECTION, SOLUTION INTRAVENOUS at 00:57

## 2020-11-24 RX ADMIN — ASPIRIN 81 MG: 81 TABLET, CHEWABLE ORAL at 18:40

## 2020-11-24 RX ADMIN — ACETAMINOPHEN 650 MG: 325 TABLET, FILM COATED ORAL at 08:11

## 2020-11-24 RX ADMIN — ACETAMINOPHEN 650 MG: 325 TABLET, FILM COATED ORAL at 00:59

## 2020-11-24 ASSESSMENT — PAIN SCALES - GENERAL
PAINLEVEL_OUTOF10: 8
PAINLEVEL_OUTOF10: 0
PAINLEVEL_OUTOF10: 0
PAINLEVEL_OUTOF10: 1
PAINLEVEL_OUTOF10: 1
PAINLEVEL_OUTOF10: 4
PAINLEVEL_OUTOF10: 1
PAINLEVEL_OUTOF10: 3

## 2020-11-24 ASSESSMENT — ENCOUNTER SYMPTOMS
CONSTIPATION: 0
COUGH: 0
VOMITING: 0
SORE THROAT: 0
NAUSEA: 0
DIARRHEA: 0
ABDOMINAL PAIN: 0
BACK PAIN: 0
SHORTNESS OF BREATH: 0
WHEEZING: 0

## 2020-11-24 NOTE — CARE COORDINATION
CASE MANAGEMENT NOTE:    Admission Date:  11/23/2020 Shelli Parker is a 50 y.o.  male    Admitted for : Chest pain [R07.9]    Met with:  Patient    PCP:  Karina Kinsey                                Insurance:  [facility]      Current Residence/ Living Arrangements:  independently at home             Current Services PTA:  No    Is patient agreeable to VNS: No    Freedom of choice provided:  NA    List of [address] provided: NA    VNS chosen:  No    DME:  none    Home Oxygen: No    Nebulizer: No    CPAP/BIPAP: No    Supplier: N/A    Potential Assistance Needed: No    SNF needed: No    Freedom of choice and list provided: No    Pharmacy:  [facility]       Does Patient want to use MEDS to BEDS? No    Is patient currently receiving oral anticoagulation therapy? No    Is the Patient an [facility] with Readmission Risk Score greater than 14%? No  If yes, pt needs a follow up appointment made within 7 days. Family Members/Caregivers that pt would like involved in their care:    Yes    If yes, list name here:  Spouse Sury    Transportation Provider:  Family             Is patient in Isolation/One on One/Altered Mental Status? No  If yes, skip next question. If no, would they like an I-Pad to  use? No  If yes, call [phone]. Discharge Plan:  11/24/20 - BCBS - Patient is from home with spouse, Has no DME's, Denies need for VNS. Plan is to return home with no needs. Will follow.  //pf                 Electronically signed by: Hannah Burden RN on 11/24/2020 at 3:36 PM

## 2020-11-24 NOTE — CARE COORDINATION
Writer spoke to Ayush Wright about patient's COVID status. Lesa stated that patient was ok to go to [facility].

## 2020-11-24 NOTE — ED NOTES
Transported by wheelchair to 2114.  [facility]  11/24/20 0025

## 2020-11-24 NOTE — PROCEDURES
[address] [facility]                    [address] [facility]. [address]                              CARDIAC STRESS TEST    PATIENT NAME: Sandra Sears                    :        1965  MED REC NO:   [id number]                              ROOM:       2114  ACCOUNT NO:   [de-identified]                           ADMIT DATE: 2020  PROVIDER:     Bruce Cover    DATE OF STUDY:  2020    ORDERING PROVIDER:  MARIA DEL CARMEN MADRID CNP    INTERPRETING PHYSICIAN:  A. [address], MD    TREADMILL STRESS TEST    INDICATION:  CHEST PAIN    INTERPRETATION:  100 % of maximum predicted heart rate:  167 bpm.  85 % of maximum predicted heart rate:  141 bpm.  Resting heart rate:  58 bpm.  Maximum heart rate achieved:  142 bpm.  % of age predicted maximum:  85 %. Resting blood pressure:  133/88 mmhg. Peak blood pressure:  200/96 mmhg. Mets:  10.7. Resting Ekg:  Sinus rhythm at 58 bpm.  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 Stress heart response:  Normal response. Blood pressure response:  Hypertensive. No chest pain during stress or recovery. No ischemic EKG changes. IMPRESSION:  NEGATIVE TREADMILL STRESS TEST. THE NUCLEAR SCAN TO FOLLOW. CONDUCTION DISORDER. RIGHT BUNDLE BRANCH BLOCK AT PEAK EXERCISE  NORMALIZED AFTER 5 MINUTES INTO RECOVERY.       Vania Amin    D: 2020 13:47:47       T: 2020 13:56:15     AS/LISY  Job#: [id number]     Doc#: Unknown    CC:    (Retain this field even if not dictated or not decipherable)

## 2020-11-24 NOTE — DISCHARGE INSTR - COC
Continuity of Care Form    Patient Name: Aneta Mcguire   :  1965  MRN:  [id number]    Admit date:  2020  Discharge date:  ***    Code Status Order: Full Code   Advance Directives:   Advance Care Flowsheet Documentation     Date/Time Healthcare Directive Type of Healthcare Directive Copy in [address] Agent's Name Healthcare Agent's Phone Number    20 0039  No, patient does not have an advance directive for healthcare treatment -- -- -- -- --          Admitting Physician:  Harleen Mai MD  PCP: Junella Bosworth, MD    Discharging Nurse: [facility] Unit/Room#: 2114/2114-01  Discharging Unit Phone Number: ***    Emergency Contact:   Extended Emergency Contact Information  Primary Emergency Contact: Sury Dickinson  Address: [address] Phone: [phone]  Mobile Phone: [phone]  Relation: Spouse    Past Surgical History:  Past Surgical History:   Procedure Laterality Date    APPENDECTOMY      BACK SURGERY      CARDIAC CATHETERIZATION  09/15/2017    HERNIA REPAIR      KNEE SURGERY Right     x 2    TONSILLECTOMY         Immunization History: There is no immunization history on file for this patient.     Active Problems:  Patient Active Problem List   Diagnosis Code    Non-ST elevation ([facility]STEMI) [facility] I21.4    Tobacco abuse Z72.0    Hyperlipidemia E78.5    WESTLEY LCX/OM (14-Dr. Ammon Maurer) Z95.5    Hypertension I10    Chest heaviness R07.89       Isolation/Infection:   Isolation          No Isolation        Patient Infection Status     None to display          Nurse Assessment:  Last Vital Signs: BP (!) 132/97   Pulse 81   Temp 97.9 °F (36.6 °C) (Oral)   Resp 18   Ht 5' 11\" (1.803 m)   Wt 227 lb 1.2 oz (103 kg)   SpO2 96%   BMI 31.67 kg/m²     Last documented pain score (0-10 scale): Pain Level: 3  Last Weight:   Wt Readings from Last 1 Encounters: 20 227 lb 1.2 oz (103 kg)     Mental Status:  {IP PT MENTAL STATUS:33494}    IV Access:  { FELICITAS IV ACCESS:753791414}    Nursing Mobility/ADLs:  Walking   {CHP DME XNAY:524361120}  Transfer  {CHP DME ZZO}  Bathing  {CHP DME ENQB:074292355}  Dressing  {CHP DME SXWH:726600806}  Toileting  {CHP DME STIJ:113626883}  Feeding  {CHP DME HKHQ:100096988}  Med Admin  {CHP DME LJTU:480475139}  Med Delivery   { FELICITAS MED Delivery:481068690}    Wound Care Documentation and Therapy:  Incision 14 Groin Right;Mid (Active)   Number of days: 2500        Elimination:  Continence:   · Bowel: {YES / GT:98365}  · Bladder: {YES / PZ:90165}  Urinary Catheter: {Urinary Catheter:812130669}   Colostomy/Ileostomy/Ileal Conduit: {YES / QH:36002}       Date of Last BM: ***    Intake/Output Summary (Last 24 hours) at 2020 1527  Last data filed at 2020 0455  Gross per 24 hour   Intake 293 ml   Output --   Net 293 ml     I/O last 3 completed shifts:   In: 293 [I.V.:293]  Out: -     Safety Concerns:     [employer] Safety Concerns:011272086}    Impairments/Disabilities:      [employer] Impairments/Disabilities:548312434}    Nutrition Therapy:  Current Nutrition Therapy:   [employer] Diet List:932587959}    Routes of Feeding: {CHP DME Other Feedings:413270288}  Liquids: {Slp liquid thickness:06025}  Daily Fluid Restriction: {CHP DME Yes amt example:094146865}  Last Modified Barium Swallow with Video (Video Swallowing Test): {Done Not Done BHWL:517526067}    Treatments at the Time of Hospital Discharge:   Respiratory Treatments: ***  Oxygen Therapy:  {Therapy; copd oxygen:70348}  Ventilator:    { CC Vent LPTI:619457571}    Rehab Therapies: Physical Therapy and Occupational Therapy  Weight Bearing Status/Restrictions: No weight bearing restirctions  Other Medical Equipment (for information only, NOT a DME order):  walker  Other Treatments: Skilled Nursing assessment and monitoring, Medication Education    Patient's personal belongings (please select all that are sent with patient):  {CHP DME Belongings:351671300}    RN SIGNATURE:  {Esignature:760072618}    CASE MANAGEMENT/SOCIAL WORK SECTION    Inpatient Status Date: ***    Readmission Risk Assessment Score:  Readmission Risk              Risk of Unplanned Readmission:        0           Discharging to Facility/ Agency     / signature:    PHYSICIAN SECTION    Prognosis: {Prognosis:4211258330}    Condition at Discharge: [address] [place] [address] Patient Condition:565610078}    Rehab Potential (if transferring to Rehab): {Prognosis:0339335102}    Recommended Labs or Other Treatments After Discharge: ***    Physician Certification: I certify the above information and transfer of Shweta Dorado  is necessary for the continuing treatment of the diagnosis listed and that he requires {Admit to Appropriate Level of Care:32760} for {GREATER/LESS:092839147} 30 days.      Update Admission H&P: {CHP DME Changes in LPFFM:263613449}    PHYSICIAN SIGNATURE:  {Esignature:962718904}

## 2020-11-24 NOTE — CARE COORDINATION
[id number]         [facility]                                    Req/Control # [Problem retrieving Specimen ID]                                   Order Date:  2020  [id number]                                          Patient Information      Name:  Ralph Ratliff  :  1965  Age:  50 y.o. Address:  [address] [place] [address], [address] [place] [address]   Zip:  [address]  PCP: Florencio Velasquez MD Sex:  M  SSN: [id number]  Home Phone: [phone]  Work Phone:    Patient MRN:  [id number]    Alt Patient ID:  [id number]  PCP Phone: [phone]       Authorizing Provider Information       AUTHORIZING PROVIDER: Kathryn Cruz MD  Physician ID: [id number]  NPI:  [id number]  Site:   Address: [address]  Phone: [phone]  Fax: [phone]               EQUIPMENT ORDERED  DME Order for Lorre Friendly (ORD   #:   [id number]) Priority  Routine Class  Hospital Performed        Associated Diagnosis:          Comments:    You must complete the order parameters below and add the medical necessity documentation for this DME in a separate note.     Folding Walker with Wheels     Current patient weight: Weight: 227 lb 1.2 oz (103 kg)  Current patient height: Height: 5' 11\" (180.3 cm)  Diagnosis: Generalized weakness  Duration: Purchase            Scheduling Instructions:                                 Specimen Source             Collection Date    Collection Time    Order Status    Expected Date                Electronically Signed By  Kathryn Cruz MD Date  2020           Responsible Party Yokasta Small     Guar-ActID   Relationship Account Type Home Phone   Lamar Holder - [address]* [address] [facility], [address] [facility] Self P/F [phone]   Employer   Work Phone   [address]     Primary Insurance  Insurance/Subscriber ID:  [id number]  [facility] [address] Name:  Lamar Holder Relationship to Patient: SelfSigned ABN: N    Payor Name:  [facility]   Plan:  [facility] PPO   Group: [id number]  Worker's Comp Date of Injury:            Cherelle Staples [address] Encounter Date/Time: 2020    Hospital Account: [de-identified]    MRN: [id number]    Patient: Pierre Mcgregor    Contact Serial #: [id number]      ENCOUNTER          Patient Class: Observation Private Enc? No Unit RM BD: [facility] PROG    Hospital Service: Intermediate   ADM DX: Chest pain [R07.9]   ADM Provider: Karrie Johnson MD   Procedure:     ATT Provider: Karrie Johnson MD   REF Provider:        PATIENT                 Name: Pierre Mcgregor : 1965 (50 yrs)   Address: [address] Sex: Male   City: [place] [address]         Marital Status:    Employer: [employer]         Buddhism: Other   Primary Care Provider: Margarito Marmolejo MD         Primary Phone: [phone]   EMERGENCY CONTACT   Contact Name Legal Guardian? Relationship to Patient Home Phone Work Phone   1. Sury Dickinson  2. *No Contact Specified*      Spouse    [phone]                 GUARANTOR            Guarantor: Pierre Mcgregor     : 1965   Address: [address] Sex: [place] [address]     Relation to Patient: Self       Home Phone: [phone]   Guarantor ID: [id number]       Work Phone:     Guarantor Employer: [employer]         Status: FULL TIME      COVERAGE        PRIMARY INSURANCE   Payor: [facility] Plan: BCBS - [place] PPO   Payor Address: [facility] [id number], [address] [place] [address] Drive       Group Number: [id number] Insurance Type: [address] Name: Jon iVrk : 1965   Subscriber ID: [id number] Jennifer Friasa. Rel. to Sub: Self   SECONDARY INSURANCE   Payor:   Plan:     Payor Address:  ,           Group Number:   Insurance Type:     Subscriber Name:   Subscriber :     Subscriber ID:   Pat.  Rel. to Sub:

## 2020-11-24 NOTE — PROGRESS NOTES
CST Exercise. Stress Tech performs patient preparation of physical comfort, review test procedures, pre-stress EKG. Consent verified. Lung Sounds clear T/O. Educated patient on test procedure'Cardiologist reviewed pre-test EKG and is present for test. Patient tolerated test well without CP and with minor SOB which resolved to baseline after test. RBBB noted at peak exercise. Dr Kaur Stands noted CST negative.

## 2020-11-24 NOTE — ED PROVIDER NOTES
[address] [facility]  Emergency Department Encounter  EmergencyMedicine Resident     Pt Name:Colton Flores  MRN: [id number]  [place] 1965  Date of evaluation: 11/23/20  PCP:  Shabnam Burton MD    [address] [place] [address]       Chief Complaint   Patient presents with    Chest Pain    Concern For COVID-19     tested positive for covid on 11/19       HISTORY OF PRESENT ILLNESS  (Location/Symptom, Timing/Onset, Context/Setting, Quality, Duration, Modifying Factors, Severity.)      Rema Livingston is a 50 y.o. male who presents to the emergency department with a 6-hour history of midsternal chest heaviness described as nonradiating and mild to moderate. Patient was initially diagnosed with COVID-19 on November 9 after a short mild URI course. He had a fever for a couple of days after positive diagnosis but then was asymptomatic. He attempted to go back to work but had to get clearance with another Covid test which she obtained on November 19. This test was again positive but the patient denies any recent fever, chills, upper respiratory symptoms, cough, nausea, or problems with bowel or bladder habits. The patient does have a history of a heart attack status post stent in 2014 and is concerned he may be having another heart attack although he says his symptoms are not as severe as that time. Chart review demonstrates a prior catheterization in 2017 demonstrating 100% known occluded RCA, patent OM stent, and new LAD 40% stenosis that was not ischemic by stress test and preserved LV function with LVEF approximately 50%. Patient has not smoked since 2014 but does chew tobacco.    PAST MEDICAL / SURGICAL / SOCIAL / FAMILY HISTORY      has a past medical history of Heart attack ([address] Hyperlipidemia, Hypertension, and Stented coronary artery. has a past surgical history that includes Appendectomy; back surgery; knee surgery (Right);  Tonsillectomy; hernia repair; and Cardiac catheterization (09/15/2017). Social History     Socioeconomic History    Marital status:      Spouse name: Not on file    Number of children: Not on file    Years of education: Not on file    Highest education level: Not on file   Occupational History    Not on file   Social Needs    Financial resource strain: Not on file    Food insecurity     Worry: Not on file     Inability: Not on file    Transportation needs     Medical: Not on file     Non-medical: Not on file   Tobacco Use    Smoking status: Former Smoker     Packs/day: 0.25     Last attempt to quit: 2014     Years since quittin.2    Smokeless tobacco: Never Used   Substance and Sexual Activity    Alcohol use: Yes     Comment: social    Drug use: No    Sexual activity: Yes     Partners: Female   Lifestyle    Physical activity     Days per week: Not on file     Minutes per session: Not on file    Stress: Not on file   Relationships    Social connections     Talks on phone: Not on file     Gets together: Not on file     Attends Pentecostal service: Not on file     Active member of club or organization: Not on file     Attends meetings of clubs or organizations: Not on file     Relationship status: Not on file    Intimate partner violence     Fear of current or ex partner: Not on file     Emotionally abused: Not on file     Physically abused: Not on file     Forced sexual activity: Not on file   Other Topics Concern    Not on file   Social History Narrative    Not on file       History reviewed. No pertinent family history. Allergies:  Erythromycin [erythromycin]    Home Medications:  Prior to Admission medications    Medication Sig Start Date End Date Taking?  Authorizing Provider   lisinopril (PRINIVIL;ZESTRIL) 2.5 MG tablet Take 2.5 mg by mouth Daily with supper    Yes Historical Provider, MD   folic acid (FOLVITE) 1 MG tablet Take 1 mg by mouth Daily with supper    Yes Historical Provider, MD   aspirin 81 MG chewable tablet Take 1 diaphoretic. Comments: Patient is anxious appearing and very hypertensive with blood pressure 213/106. HENT:      Head: Normocephalic and atraumatic. Right Ear: External ear normal.      Left Ear: External ear normal.      Nose: Nose normal.      Mouth/Throat:      Mouth: Mucous membranes are moist.   Eyes:      Extraocular Movements: Extraocular movements intact. Conjunctiva/sclera: Conjunctivae normal.   Neck:      Musculoskeletal: Normal range of motion and neck supple. Trachea: No tracheal deviation. Cardiovascular:      Rate and Rhythm: Normal rate and regular rhythm. Heart sounds: Normal heart sounds. No murmur. No friction rub. No gallop. Pulmonary:      Effort: Pulmonary effort is normal. No respiratory distress. Breath sounds: Normal breath sounds. No wheezing, rhonchi or rales. Abdominal:      General: Abdomen is flat. There is no distension. Palpations: Abdomen is soft. There is no mass. Tenderness: There is no abdominal tenderness. There is no guarding or rebound. Musculoskeletal: Normal range of motion. General: No swelling, deformity or signs of injury. Skin:     General: Skin is warm and dry. Capillary Refill: Capillary refill takes less than 2 seconds. Coloration: Skin is not jaundiced. Findings: No bruising or lesion. Neurological:      General: No focal deficit present. Mental Status: He is alert and oriented to person, place, and time. Mental status is at baseline. Motor: No abnormal muscle tone.          DIFFERENTIAL  DIAGNOSIS     PLAN (LABS / IMAGING / EKG):  Orders Placed This Encounter   Procedures    XR CHEST PORTABLE    CBC Auto Differential    Basic Metabolic Panel w/ Reflex to MG    Troponin    Troponin    Comprehensive Metabolic Panel w/ Reflex to MG    Magnesium    Lipid panel - fasting    CBC    Diet NPO Effective Now    Telemetry Monitoring    Vital signs per unit routine    Notify physician    Up as tolerated    Daily weights    Intake and output    Place intermittent pneumatic compression device    Telemetry monitoring - continuous duration    Full Code    Inpatient consult to Internal Medicine    Initiate Oxygen Therapy Protocol    Pulse Oximetry Spot Check    EKG 12 Lead    EKG 12 lead    ECHO Complete 2D W Doppler W Color    Insert peripheral IV    PATIENT STATUS (FROM ED OR OR/PROCEDURAL) Observation       MEDICATIONS ORDERED:  Orders Placed This Encounter   Medications    aspirin chewable tablet 324 mg    nitroGLYCERIN (NITROSTAT) SL tablet 0.4 mg    aspirin chewable tablet 81 mg    atorvastatin (LIPITOR) tablet 80 mg    folic acid (FOLVITE) tablet 1 mg    lisinopril (PRINIVIL;ZESTRIL) tablet 2.5 mg    omega-3 acid ethyl esters (LOVAZA) capsule 1 g    ticagrelor (BRILINTA) tablet 60 mg    sodium chloride flush 0.9 % injection 10 mL    sodium chloride flush 0.9 % injection 10 mL    OR Linked Order Group     potassium chloride (KLOR-CON M) extended release tablet 40 mEq     potassium bicarb-citric acid (EFFER-K) effervescent tablet 40 mEq     potassium chloride 10 mEq/100 mL IVPB (Peripheral Line)    potassium chloride 10 mEq/100 mL IVPB (Peripheral Line)    magnesium sulfate 1 g in dextrose 5% 100 mL IVPB    OR Linked Order Group     acetaminophen (TYLENOL) tablet 650 mg     acetaminophen (TYLENOL) suppository 650 mg    magnesium hydroxide (MILK OF MAGNESIA) 400 MG/5ML suspension 30 mL    OR Linked Order Group     promethazine (PHENERGAN) tablet 12.5 mg     ondansetron (ZOFRAN) injection 4 mg    DISCONTD: enoxaparin (LOVENOX) injection 40 mg    nitroGLYCERIN (NITROSTAT) SL tablet 0.4 mg    0.9 % sodium chloride infusion    enoxaparin (LOVENOX) injection 30 mg       DDX: Eleanor Brittle is a 50 y.o. male who presents to the emergency department with a 6-hour history of chest heaviness.  Differential diagnosis includes ACS, pneumonia, pneumothorax, MD  Emergency Medicine Resident    This patient was evaluated in the Emergency Department for symptoms described in the history of present illness. He/she was evaluated in the context of the global COVID-19 pandemic, which necessitated consideration that the patient might be at risk for infection with the SARS-CoV-2 virus that causes COVID-19. Institutional protocols and algorithms that pertain to the evaluation of patients at risk for COVID-19 are in a state of rapid change based on information released by regulatory bodies including the CDC and federal and state organizations. These policies and algorithms were followed during the patient's care in the ED.     (Please note that portions of thisnote were completed with a voice recognition program.  Efforts were made to edit the dictations but occasionally words are mis-transcribed.)        Linn Arthur MD  Resident  11/24/20 Albert Serrano MD  Resident  12/01/20 7848

## 2020-11-24 NOTE — ED NOTES
Admission Dx: chest heaviness, hypertension    Pts Chief Complaints on Arrival: chest pain    ADL's - Self-care    Pending Diagnostics:  2nd troponin    Residence PTA: single story home    Special Considerations/Circumstances:  n/a    Vitals: Current vital signs:  /77   Pulse 70   Temp 98.1 °F (36.7 °C) (Oral)   Resp 18   Ht 5' 11\" (1.803 m)   Wt 225 lb (102.1 kg)   SpO2 95%   BMI 31.38 kg/m²                MEWS Score: 97[address] St Inocente Barnhart, RN  11/23/20 4492

## 2020-11-24 NOTE — CARE COORDINATION
CASE MANAGEMENT NOTE:    Admission Date:  11/23/2020 Christina Adan is a 50 y.o.  male    Admitted for : Chest pain [R07.9]    Met with:  Patient    PCP:  Needs new PCp                                Insurance:  medicare      Current Residence/ Living Arrangements:  independently at home             Current Services PTA:  No    Is patient agreeable to VNS: Yes    Freedom of choice provided:  Yes    List of [address] provided: No    VNS chosen:  Yes    DME:  Uses his mothers old hospital bed and its broken. Home Oxygen: No    Nebulizer: No    CPAP/BIPAP: No    Supplier: N/A    Potential Assistance Needed: Yes    SNF needed: No    Freedom of choice and list provided: NA    Pharmacy:  [facility]       Does Patient want to use MEDS to BEDS? No    Is patient currently receiving oral anticoagulation therapy? No    Is the Patient an [facility] with Readmission Risk Score greater than 14%? No  If yes, pt needs a follow up appointment made within 7 days. Family Members/Caregivers that pt would like involved in their care:    Yes    If yes, list name here:  Sister Nina Lovelace    Transportation Provider:  Family             Is patient in Isolation/One on One/Altered Mental Status? No  If yes, skip next question. If no, would they like an I-Pad to  use? No  If yes, call [phone]. Discharge Plan:  11/24/20 - medicare - Patient is from home alone, DME: uses his mothers old hospital bed and doesn't work well, Wants a wheeled walker. Needs a new PCP and wants [facility]. Agreeable to VNS and wants [address], he had them before, [facility], Referral sent. FELICITAS needs singed and completed. Will follow . //pf                Electronically signed by: Ayan Rose RN on 11/24/2020 at 3:24 PM

## 2020-11-24 NOTE — FLOWSHEET NOTE
11/24/20 1033   Encounter Summary   Services provided to: Family   Referral/Consult From: [place]   Continue Visiting   (11-24-20)   Complexity of Encounter Moderate   Length of Encounter 15 minutes   Routine   Type Initial   Assessment Calm; Approachable; Anxious   Intervention Active listening;Explored feelings, thoughts, concerns;[place];Sustaining presence/ Ministry of presence   Outcome Expressed gratitude;Engaged in conversation

## 2020-11-24 NOTE — ED PROVIDER NOTES
[address]  eMERGENCY dEPARTMENT eNCOUnter   Attending Attestation     Pt Name: Laury Combs  MRN: [id number]  [place] 1965  Date of evaluation: 11/23/20       Laury Combs is a 50 y.o. male who presents with Chest Pain and Concern For COVID-19 (tested positive for covid on 11/19)      History:   Patient has had a 6-hour history of chest pressure substernal nonradiating with no shortness of breath or palpitations. Patient does have a history of cardiac disease and high blood pressure. Patient's high blood pressure is extremely high today. Patient has had a recent illness and was tested positive for Covid but his symptoms have since resolved. Exam: Vitals:   Vitals:    11/23/20 2121   BP: (!) 213/106   Pulse: 70   Resp: 15   Temp: 98.1 °F (36.7 °C)   TempSrc: Oral   SpO2: 97%   Weight: 225 lb (102.1 kg)   Height: 5' 11\" (1.803 m)     Heart regular rate rhythm no murmurs. Lungs clear to auscultation bilaterally. I performed a history and physical examination of the patient and discussed management with the resident. I reviewed the residents note and agree with the documented findings and plan of care. Any areas of disagreement are noted on the chart. I was personally present for the key portions of any procedures. I have documented in the chart those procedures where I was not present during the key portions. I have personally reviewed all images and agree with the resident's interpretation. I have reviewed the emergency nurses triage note. I agree with the chief complaint, past medical history, past surgical history, allergies, medications, social and family history as documented unless otherwise noted below. Documentation of the HPI, Physical Exam and Medical Decision Making performed by medical students or scribes is based on my personal performance of the HPI, PE and MDM.  I personally evaluated and examined the patient in conjunction with the APC and agree with the assessment, treatment plan, and disposition of the patient as recorded by the APC. Additional findings are as noted.     Rhys Klinefelter, MD  Attending Emergency  Physician              Leatha Broussard MD  11/23/20 0070

## 2020-11-24 NOTE — PROGRESS NOTES
Admitted to room 2114 from ER per bed. Oriented to room and call light. Vitals and assessment completed. No distress noted.

## 2020-11-24 NOTE — ED NOTES
Mode of arrival (squad #, walk in, police, etc) : walk in from home         Chief complaint(s): chest pain        Arrival Note (brief scenario, treatment PTA, etc). : Pt arrives due to mid sternal chest pressure beginning PTA. Pt states pain is nonradiating and continuous. Pt hx MI. Pt compliant with lisinopril. Pt anxious and hypertensive upon arrival. Pt attached to cardiac monitor and continuous pulse oximetry. Pt is A&Ox4, eupneic, PWD. GCS=15. Call light in reach. C= \"Have you ever felt that you should Cut down on your drinking? \"  No  A= \"Have people Annoyed you by criticizing your drinking? \"  No  G= \"Have you ever felt bad or Guilty about your drinking? \"  No  E= \"Have you ever had a drink as an Eye-opener first thing in the morning to steady your nerves or to help a hangover? \"  No      Deferred []      Reason for deferring: N/A    *If yes to two or more: probable alcohol abuse. Arsen Watson RN  11/23/20 1786

## 2020-11-24 NOTE — PROGRESS NOTES
RN called and notified Dr. Abner Perez that he was consulted for a positive stress test. He was also notified that the patient was positive for covid on 11/9 and 11/19. He requested that Infectious disease be consulted for cardiac cath clearance. RN called and notified Dr. Donta Polo of consult. She stated that the patient was okay for cardiac cath since he was out of the contagious window and not symptomatic. Dr. Abner Perez was notified and requested that we keep the patient npo after midnight.

## 2020-11-25 VITALS
HEIGHT: 71 IN | RESPIRATION RATE: 18 BRPM | TEMPERATURE: 97.9 F | OXYGEN SATURATION: 96 % | HEART RATE: 61 BPM | SYSTOLIC BLOOD PRESSURE: 130 MMHG | DIASTOLIC BLOOD PRESSURE: 90 MMHG | WEIGHT: 229.72 LBS | BODY MASS INDEX: 32.16 KG/M2

## 2020-11-25 LAB
ALBUMIN SERPL-MCNC: 3.5 G/DL (ref 3.5–5.2)
ALBUMIN/GLOBULIN RATIO: ABNORMAL (ref 1–2.5)
ALP BLD-CCNC: 82 U/L (ref 40–129)
ALT SERPL-CCNC: 41 U/L (ref 5–41)
ANION GAP SERPL CALCULATED.3IONS-SCNC: 6 MMOL/L (ref 9–17)
AST SERPL-CCNC: 26 U/L
BILIRUB SERPL-MCNC: 0.34 MG/DL (ref 0.3–1.2)
BUN BLDV-MCNC: 15 MG/DL (ref 6–20)
BUN/CREAT BLD: ABNORMAL (ref 9–20)
CALCIUM SERPL-MCNC: 9.1 MG/DL (ref 8.6–10.4)
CHLORIDE BLD-SCNC: 108 MMOL/L (ref 98–107)
CO2: 27 MMOL/L (ref 20–31)
CREAT SERPL-MCNC: 0.99 MG/DL (ref 0.7–1.2)
EKG ATRIAL RATE: 67 BPM
EKG P AXIS: 33 DEGREES
EKG P-R INTERVAL: 146 MS
EKG Q-T INTERVAL: 418 MS
EKG QRS DURATION: 96 MS
EKG QTC CALCULATION (BAZETT): 441 MS
EKG R AXIS: 33 DEGREES
EKG T AXIS: 49 DEGREES
EKG VENTRICULAR RATE: 67 BPM
GFR AFRICAN AMERICAN: >60 ML/MIN
GFR NON-AFRICAN AMERICAN: >60 ML/MIN
GFR SERPL CREATININE-BSD FRML MDRD: ABNORMAL ML/MIN/{1.73_M2}
GFR SERPL CREATININE-BSD FRML MDRD: ABNORMAL ML/MIN/{1.73_M2}
GLUCOSE BLD-MCNC: 101 MG/DL (ref 70–99)
HCT VFR BLD CALC: 40.9 % (ref 41–53)
HEMOGLOBIN: 14.2 G/DL (ref 13.5–17.5)
MCH RBC QN AUTO: 29.6 PG (ref 26–34)
MCHC RBC AUTO-ENTMCNC: 34.6 G/DL (ref 31–37)
MCV RBC AUTO: 85.5 FL (ref 80–100)
NRBC AUTOMATED: ABNORMAL PER 100 WBC
PDW BLD-RTO: 13 % (ref 11.5–14.9)
PLATELET # BLD: 179 K/UL (ref 150–450)
PMV BLD AUTO: 7.8 FL (ref 6–12)
POTASSIUM SERPL-SCNC: 4.7 MMOL/L (ref 3.7–5.3)
RBC # BLD: 4.78 M/UL (ref 4.5–5.9)
SODIUM BLD-SCNC: 141 MMOL/L (ref 135–144)
TOTAL PROTEIN: 5.8 G/DL (ref 6.4–8.3)
WBC # BLD: 6.2 K/UL (ref 3.5–11)

## 2020-11-25 PROCEDURE — 99217 PR OBSERVATION CARE DISCHARGE MANAGEMENT: CPT | Performed by: INTERNAL MEDICINE

## 2020-11-25 PROCEDURE — G0378 HOSPITAL OBSERVATION PER HR: HCPCS

## 2020-11-25 PROCEDURE — 93010 ELECTROCARDIOGRAM REPORT: CPT | Performed by: INTERNAL MEDICINE

## 2020-11-25 PROCEDURE — 6370000000 HC RX 637 (ALT 250 FOR IP): Performed by: NURSE PRACTITIONER

## 2020-11-25 PROCEDURE — 36415 COLL VENOUS BLD VENIPUNCTURE: CPT

## 2020-11-25 PROCEDURE — 6370000000 HC RX 637 (ALT 250 FOR IP): Performed by: INTERNAL MEDICINE

## 2020-11-25 PROCEDURE — 85027 COMPLETE CBC AUTOMATED: CPT

## 2020-11-25 PROCEDURE — 80053 COMPREHEN METABOLIC PANEL: CPT

## 2020-11-25 RX ORDER — ISOSORBIDE MONONITRATE 60 MG/1
60 TABLET, EXTENDED RELEASE ORAL DAILY
Qty: 30 TABLET | Refills: 3 | Status: SHIPPED | OUTPATIENT
Start: 2020-11-25

## 2020-11-25 RX ADMIN — ACETAMINOPHEN 650 MG: 325 TABLET, FILM COATED ORAL at 12:54

## 2020-11-25 RX ADMIN — METOPROLOL TARTRATE 25 MG: 25 TABLET, FILM COATED ORAL at 08:19

## 2020-11-25 ASSESSMENT — PAIN SCALES - GENERAL: PAINLEVEL_OUTOF10: 3

## 2020-11-25 NOTE — CONSULTS
1/21/14  Yes Indira Martinez MD   ticagrelor (BRILINTA) 90 MG TABS tablet Take 1 tablet by mouth 2 times daily. Patient taking differently: Take 60 mg by mouth Daily with supper  1/21/14  Yes Indira Martinez MD   omega-3 acid ethyl esters (LOVAZA) 1 G capsule Take 2 capsules by mouth 2 times daily. Patient taking differently: Take 1 g by mouth Daily with supper  1/21/14  Yes Stephen Soto MD   nitroGLYCERIN (NITROSTAT) 0.4 MG SL tablet Place 1 tablet under the tongue every 5 minutes as needed for Chest pain. 1/21/14   Indira Martinez MD       Allergies:  Erythromycin [erythromycin]    Social History:   reports that he quit smoking about 6 years ago. He smoked 0.25 packs per day. He has never used smokeless tobacco. He reports current alcohol use. He reports that he does not use drugs. Family History:   Negative for early CAD    REVIEW OF SYSTEMS:    · Constitutional: there has been no unanticipated weight loss. No change in functional capacity. · Eyes: No visual changes or diplopia. · ENT: No Headaches, hearing loss or vertigo. No mouth sores or sore throat. · Cardiovascular: +ve chest pain as mentioned above, denies any dyspnea, orthopnea, palpitations or pre syncope  · Respiratory: No hx of productive cough, pleuritic chest pain   · Gastrointestinal: No abdominal pain, appetite loss, blood in stools. No change in bowel habits. · Genitourinary: No dysuria, trouble voiding, or hematuria. · Musculoskeletal:  No gait disturbance, No weakness or joint complaints. · Integumentary: No rash or pruritis. · Neurological: No headache, weakness, numbness or tingling. No change in gait, balance, coordination. · Psychiatric: No anxiety, or depression. · Endocrine: No temperature intolerance. No excessive thirst, fluid intake, or urination. No tremor. · Hematologic/Lymphatic: No abnormal bruising or bleeding, blood clots or swollen lymph nodes. · Allergic/Immunologic: No nasal congestion or hives.     PHYSICAL EXAM:    Physical Examination:    BP (!) 130/90   Pulse 61   Temp 97.9 °F (36.6 °C) (Oral)   Resp 18   Ht 5' 11\" (1.803 m)   Wt 229 lb 11.5 oz (104.2 kg)   SpO2 96%   BMI 32.04 kg/m²    Constitutional and General Appearance: alert, cooperative, in no distress   HEENT: PERRL, no cervical lymphadenopathy. Normal oral mucosa  Respiratory:  · Normal excursion and expansion without use of accessory muscles  · Resp Auscultation: Good respiratory effort. No for increased work of breathing. On auscultation: clear to auscultation bilaterally, no wheezing, no rales. Cardiovascular:  · The apical impulse is not displaced  · Heart tones are crisp and normal. regular S1 and S2. Murmurs: None   · Jugular venous pulsation Normal  · Thre is no carotid bruit   · Peripheral pulses are symmetrical and full   Abdomen:  · No masses or tenderness  · Bowel sounds present  Extremities:  ·  No Cyanosis or Clubbing  ·  Lower extremity edema: No edema   ·  Skin: Warm and dry  Neurological:  · Not done     DATA:    Diagnostics:      EKG:   SR, normal ecg    2 d echo 11/24/2020  Normal left ventricle size and function with an estimated EF 50-55%. No segmental wall motion abnormalities seen. Mild left ventricular hypertrophy. Normal right ventricular size and function. Left atrium is normal in size. Right atrium is normal in size. Normal aortic valve structure and function without stenosis or  regurgitation. Normal aortic root dimension. Normal mitral valve structure and function. Mild mitral regurgitation. Normal tricuspid valve structure and function. Mild tricuspid regurgitation. Estimated right ventricular systolic pressure is 20 mmHg. Normal right  ventricular systolic pressure. IVC normal diameter & inspiratory collapse indicating normal RA filling  pressure . No significant pericardial effusion is seen. Bradycardia noted during study    Stress test 11/24/2020  1.  Findings consistent with reversible ischemia of the with repeat coronary angiography and possible  intervention. He has an upcoming appointment with Dr. Kathy Arreola in 2 weeks. 2. Continue low-dose aspirin. Switch Brilinta to 60 mg twice daily. Continue statin and beta-blocker. Start Imdur 30 mg daily  3. Patient was educated regarding symptoms of acute coronary syndrome in which she will need to seek emergent medical attention  4.  Okay to discharge home with outpatient follow-up as scheduled        Fahad Robbins MD, [facility]

## 2020-11-25 NOTE — PROGRESS NOTES
Consulted for cardiac clearance for cardiac catherization. Patient being discharged home with no plans for cardiac catherization   Will sign off.

## 2020-11-25 NOTE — DISCHARGE SUMMARY
[place] [address] Internal Medicine    Discharge Summary     Patient ID: Christina Adan  :  1965   MRN: [id number]     ACCOUNT:  [de-identified]   Patient's PCP: Aisha Putnam MD  Admit Date: 2020   Discharge Date: 2020    Length of Stay: 0  Code Status:  Full Code  Admitting Physician: Jillian Em MD  Discharge Physician: Elo Rojas MD     Active Discharge Diagnoses:     Primary Problem  Chest heaviness      [place] Problems    Diagnosis Date Noted    Chest heaviness [R07.89] 09/15/2017       Admission Condition: Poor     Discharged Condition: fair    Hospital Stay:     Hospital Course:  Christina Adan is a 50 y.o. male who was admitted for the management of Chest heaviness , presented to ER with Chest Pain and Concern For COVID-19 (tested positive for covid on )  Patient mated with chest heaviness, stress test was done which was concerning for reversible ischemia. His symptoms improved, patient was evaluated by cardiologist, no plan for further cardiac cath per cardiology  Patient getting discharged home, started on Lopressor and Imdur. He will follow-up with cardiology as outpatient        Significant therapeutic interventions:     Significant Diagnostic Studies:   Labs / Micro:        ,     Radiology:    Xr Chest Portable    Result Date: 2020  EXAMINATION: ONE XRAY VIEW OF THE CHEST 2020 10:08 pm COMPARISON: 2017 HISTORY: ORDERING SYSTEM PROVIDED HISTORY: JANNIE RICHMOND TECHNOLOGIST PROVIDED HISTORY: CP, SOA Reason for Exam: Chest pains today Acuity: Acute Type of Exam: Initial FINDINGS: The lungs are without acute focal process. There is no effusion or pneumothorax. The cardiomediastinal silhouette is without acute process. The osseous structures are without acute process. No acute process.      Nm Cardiac Stress Test Nuclear Imaging    Result Date: 2020  EXAMINATION: MYOCARDIAL PERFUSION IMAGING 11/24/2020 TECHNIQUE: Rest dose:  11.8 mCi Tc-99m sestamibi intravenously Stress dose:  37 mCi Tc-99m sestamibi intravenously Under cardiology supervision, treadmill exercise testing was performed. Peak heart rate of 142 bpm is 85% of the age predicted maximum heart rate. SPECT imaging was acquired following injection of the sestamibi. ECG gating was obtained following the stress acquisition. COMPARISON: 09/15/2017 HISTORY: ORDERING SYSTEM PROVIDED HISTORY: Chest pain TECHNOLOGIST PROVIDED HISTORY: Reason for Exam: Chest pain Procedure Type->Exercise Chest Pain Reason for Exam: Chest pain Acuity: Unknown Type of Exam: Unknown 46-year-old male with chest pain FINDINGS: The patient achieved a maximum heart rate of 142 beats per minute, 85 % of the maximum age predicted heart rate of 167 beats per minute. Perfusion: There is a fixed perfusion defect involving the majority of the inferior wall. There is mild reversibility involving the inferior wall near the apex compared with the prone stress and standard stress images. Function: The gated SPECT data demonstrates normal left ventricular size. Mild associated basal inferior wall hypokinesis. Left ventricular ejection fraction:  50% TID score:  0.96 (threshold value of 1.39 is used for Lexiscan stress with Tc-99m). There is no stress-induced cavitary dilatation to suggest compensated triple vessel disease. End diastolic volume:  698UH Scores are visually adjusted to account for potential artifact. Summed stress score:  8 Summed rest score:  5 Summed reversibility score:  3     1. Findings consistent with reversible ischemia of the inferior wall near the apex. 2. Underlying infarct involving the remainder of the inferior wall. Mild associated basal and mid inferior wall hypokinesis. 3. Left ventricular ejection fraction of 50%. 4.  Please see report for EKG portion of the examination which will be performed separately by physician from cardiology.  Risk stratification:  Intermediate risk Note:  Risk stratification incorporates both clinical history and test results. Final risk determination is the responsibility of the ordering provider as history and other test results may increase or decrease the risk stratification reported for this examination. Risk stratification criteria are adapted from \"Noninvasive Risk Stratification\" criteria from Odilia Melara. Al, ACC/AATS/AHA/ASE/ASNC/SCAI/SCCT/STS 2017 Appropriate Use Criteria For Coronary Revascularization in Patients With Stable Ischemic Heart Disease [facility] Volume 69, Issue 17, May 2017 High risk (>3% annual death or MI) 1. Severe resting LV dysfunction (LVEF >35%) not readily explained by non coronary causes 2. Resting perfusion abnormalities greater than 10% of the myocardium in patients without prior history or evidence of MI 3. Stress-induced perfusion abnormalities encumbering greater than or equal to 10% myocardium or stress segmental scores indicating multiple vascular territories with abnormalities 4. Stress-induced LV dilatation (TID ratio greater than 1.19 for exercise and greater than 1.39 for regadenoson) Intermediate risk (1% to 3% annual death or MI) 1. Mild/moderate resting LV dysfunction (LVEF 35% to 49%) not readily explained by non coronary causes. 2. Resting perfusion abnormalities in 5%-9.9% of the myocardium in patients without a history or prior evidence of MI 3. Stress-induced perfusion abnormality encumbering 5%-9.9% of the myocardium or stress segmental scores indicating 1 vascular territory with abnormalities but without LV dilation 4. Small wall motion abnormality involving 1-2 segments and only 1 coronary bed. Low Risk (Less than 1% annual death or MI) 1. Normal or small myocardial perfusion defect at rest or with stress encumbering less than 5% of the myocardium.  This examination was read in conjunction with the cardiology fellow, Dr. Gustavo Cruz The findings were sent to the Radiology Results Communication Center at 1:25 pm on 11/24/2020to be communicated to a licensed caregiver. Consultations:    Consults:     Final Specialist Recommendations/Findings:   IP CONSULT TO INTERNAL MEDICINE  IP CONSULT TO CARDIOLOGY  IP CONSULT TO INFECTIOUS DISEASES      The patient was seen and examined on day of discharge and this discharge summary is in conjunction with any daily progress note from day of discharge. Discharge plan:     Disposition: Home    Physician Follow Up:     No follow-up provider specified. Requiring Further Evaluation/Follow Up POST HOSPITALIZATION/Incidental Findings:    Diet: cardiac diet    Activity: As tolerated    Instructions to Patient:     Discharge Medications:      Medication List      START taking these medications    isosorbide mononitrate 60 MG extended release tablet  Commonly known as:  IMDUR  Take 1 tablet by mouth daily     metoprolol tartrate 25 MG tablet  Commonly known as:  LOPRESSOR  Take 1 tablet by mouth 2 times daily        CHANGE how you take these medications    aspirin 81 MG chewable tablet  Take 1 tablet by mouth daily. What changed:  when to take this     atorvastatin 80 MG tablet  Commonly known as:  LIPITOR  Take 1 tablet by mouth nightly. What changed:  when to take this     omega-3 acid ethyl esters 1 g capsule  Commonly known as:  LOVAZA  Take 2 capsules by mouth 2 times daily. What changed:    · how much to take  · when to take this     ticagrelor 90 MG Tabs tablet  Commonly known as:  BRILINTA  Take 1 tablet by mouth 2 times daily. What changed:    · how much to take  · when to take this        CONTINUE taking these medications    folic acid 1 MG tablet  Commonly known as:  FOLVITE     lisinopril 2.5 MG tablet  Commonly known as:  PRINIVIL;ZESTRIL     nitroGLYCERIN 0.4 MG SL tablet  Commonly known as:  NITROSTAT  Place 1 tablet under the tongue every 5 minutes as needed for Chest pain.            Where to Get Your Medications      These medications were sent to [facility], [address]    Phone:  [phone]   · isosorbide mononitrate 60 MG extended release tablet  · metoprolol tartrate 25 MG tablet         Time Spent on discharge is  35 mins in patient examination, evaluation, counseling as well as medication reconciliation, prescriptions for required medications, discharge plan and follow up. Electronically signed by   Nena Franco MD  11/25/2020  12:18 PM      Thank you Dr. Yanira Clay MD for the opportunity to be involved in this patient's care.

## 2020-11-25 NOTE — PLAN OF CARE
Problem: Falls - Risk of:  Goal: Will remain free from falls  Description: Will remain free from falls  11/25/2020 1456 by Deshaun Berger RN  Outcome: Completed  11/25/2020 0255 by Shun Harrington RN  Outcome: Ongoing  Note: Pt. Free of falls and injuries this shift.    Goal: Absence of physical injury  Description: Absence of physical injury  Outcome: Completed     Problem: Cardiac Output - Decreased:  Goal: Hemodynamic stability will improve  Description: Hemodynamic stability will improve  11/25/2020 1456 by Deshaun Berger RN  Outcome: Completed  11/25/2020 0255 by Shun Harrington RN  Outcome: Ongoing

## 2020-11-25 NOTE — FLOWSHEET NOTE
Patient discharged at this time. Discharge instructions reviewed with patient, all questions answered. New medications reviewed with patient. IV removed, all belongings with patient.

## 2020-11-25 NOTE — CARE COORDINATION
ONGOING DISCHARGE PLAN:    Spoke with patient regarding discharge plan and patient confirms that plan is still to return home with no needs. Probable discharge home today. Will continue to follow for additional discharge needs.     Electronically signed by Ayan Rose RN on 11/25/2020 at 1:59 PM

## 2020-11-25 NOTE — FLOWSHEET NOTE
Dr. Faye Hdez paged at this time in regards to plan. Patient is upset that he may not get his cardiac cath today.

## 2020-11-25 NOTE — PLAN OF CARE
Problem: Falls - Risk of:  Goal: Will remain free from falls  Description: Will remain free from falls  11/25/2020 0255 by Preet Lao RN  Outcome: Ongoing  Note: Pt. Free of falls and injuries this shift.       Problem: Cardiac Output - Decreased:  Goal: Hemodynamic stability will improve  Description: Hemodynamic stability will improve  11/25/2020 0255 by Preet Lao RN  Outcome: Ongoing

## 2020-11-25 NOTE — PROGRESS NOTES
[facility]    Progress Note     11/25/2020    11:03 AM    Name:   Ruddy Horn  MRN:     [id number]     Acct:      [de-identified]   Room:   [address] [place] [address] Day:  0  Admit Date:  11/23/2020  9:22 PM    PCP:   Parth Lacey MD  Code Status:  Full Code    Subjective:     C/C:   Chief Complaint   Patient presents with    Chest Pain    Concern For COVID-19     tested positive for covid on 11/19     Principal Problem:    Chest heaviness  Resolved Problems:    * No resolved hospital problems. *      Interval History Status: improved. Patient has hypertension, hyperlipidemia coronary artery disease s/p stent. Patient has strong family history of heart disease  He was tested positive for COVID-19 earlier this month  Patient completely Asymptomatic with Covid symptoms  Underwent stress test which was positive  EF was 55%  Evaluated by cardiologist  Patient kept n.p.o.   Chest pain has completely gone     Significant last 24 hr data reviewed ;   Vitals:    11/24/20 2030 11/25/20 0100 11/25/20 0131 11/25/20 0815   BP: (!) 147/86 116/74  (!) 130/90   Pulse: 63 61  61   Resp: 18 18  18   Temp: 98 °F (36.7 °C) 97.9 °F (36.6 °C)  97.9 °F (36.6 °C)   TempSrc: Oral Oral  Oral   SpO2: 95% 95%  96%   Weight:   229 lb 11.5 oz (104.2 kg)    Height:          Recent Results (from the past 24 hour(s))   Comprehensive Metabolic Panel w/ Reflex to MG    Collection Time: 11/25/20  5:16 AM   Result Value Ref Range    Glucose 101 (H) 70 - 99 mg/dL    BUN 15 6 - 20 mg/dL    CREATININE 0.99 0.70 - 1.20 mg/dL    Bun/Cre Ratio NOT REPORTED 9 - 20    Calcium 9.1 8.6 - 10.4 mg/dL    Sodium 141 135 - 144 mmol/L    Potassium 4.7 3.7 - 5.3 mmol/L    Chloride 108 (H) 98 - 107 mmol/L    CO2 27 20 - 31 mmol/L    Anion Gap 6 (L) 9 - 17 mmol/L    Alkaline Phosphatase 82 40 - 129 U/L    ALT 41 5 - 41 U/L    AST 26 <40 U/L    Total Bilirubin 0.34 0.3 - 1.2 mg/dL    Total Protein 5.8 (L) 6.4 - 8.3 g/dL    Alb 3.5 3.5 - 5.2 g/dL    Albumin/Globulin Ratio NOT REPORTED 1.0 - 2.5    GFR Non-African American >60 >60 mL/min    GFR African American >60 >60 mL/min    GFR Comment          GFR Staging NOT REPORTED    CBC    Collection Time: 11/25/20  5:16 AM   Result Value Ref Range    WBC 6.2 3.5 - 11.0 k/uL    RBC 4.78 4.5 - 5.9 m/uL    Hemoglobin 14.2 13.5 - 17.5 g/dL    Hematocrit 40.9 (L) 41 - 53 %    MCV 85.5 80 - 100 fL    MCH 29.6 26 - 34 pg    MCHC 34.6 31 - 37 g/dL    RDW 13.0 11.5 - 14.9 %    Platelets 168 041 - 298 k/uL    MPV 7.8 6.0 - 12.0 fL    NRBC Automated NOT REPORTED per 100 WBC     No results for input(s): POCGLU in the last 72 hours. Xr Chest Portable    Result Date: 11/23/2020  EXAMINATION: ONE XRAY VIEW OF THE CHEST 11/23/2020 10:08 pm COMPARISON: September 14, 2017 HISTORY: ORDERING SYSTEM PROVIDED HISTORY: CP, SOA TECHNOLOGIST PROVIDED HISTORY: CP, SOA Reason for Exam: Chest pains today Acuity: Acute Type of Exam: Initial FINDINGS: The lungs are without acute focal process. There is no effusion or pneumothorax. The cardiomediastinal silhouette is without acute process. The osseous structures are without acute process. No acute process. Nm Cardiac Stress Test Nuclear Imaging    Result Date: 11/24/2020  EXAMINATION: MYOCARDIAL PERFUSION IMAGING 11/24/2020 TECHNIQUE: Rest dose:  11.8 mCi Tc-99m sestamibi intravenously Stress dose:  37 mCi Tc-99m sestamibi intravenously Under cardiology supervision, treadmill exercise testing was performed. Peak heart rate of 142 bpm is 85% of the age predicted maximum heart rate. SPECT imaging was acquired following injection of the sestamibi. ECG gating was obtained following the stress acquisition.  COMPARISON: 09/15/2017 HISTORY: ORDERING SYSTEM PROVIDED HISTORY: Chest pain TECHNOLOGIST PROVIDED HISTORY: Reason for Exam: Chest pain Procedure Type->Exercise Chest Pain Reason for Exam: Chest pain Acuity: Unknown Type of Exam: Unknown 26-year-old male with chest pain FINDINGS: The patient achieved a maximum heart rate of 142 beats per minute, 85 % of the maximum age predicted heart rate of 167 beats per minute. Perfusion: There is a fixed perfusion defect involving the majority of the inferior wall. There is mild reversibility involving the inferior wall near the apex compared with the prone stress and standard stress images. Function: The gated SPECT data demonstrates normal left ventricular size. Mild associated basal inferior wall hypokinesis. Left ventricular ejection fraction:  50% TID score:  0.96 (threshold value of 1.39 is used for Lexiscan stress with Tc-99m). There is no stress-induced cavitary dilatation to suggest compensated triple vessel disease. End diastolic volume:  609UG Scores are visually adjusted to account for potential artifact. Summed stress score:  8 Summed rest score:  5 Summed reversibility score:  3     1. Findings consistent with reversible ischemia of the inferior wall near the apex. 2. Underlying infarct involving the remainder of the inferior wall. Mild associated basal and mid inferior wall hypokinesis. 3. Left ventricular ejection fraction of 50%. 4.  Please see report for EKG portion of the examination which will be performed separately by physician from cardiology. Risk stratification:  Intermediate risk Note:  Risk stratification incorporates both clinical history and test results. Final risk determination is the responsibility of the ordering provider as history and other test results may increase or decrease the risk stratification reported for this examination. Risk stratification criteria are adapted from \"Noninvasive Risk Stratification\" criteria from Odilia Melara. Al, ACC/AATS/AHA/ASE/ASNC/SCAI/SCCT/STS 2017 Appropriate Use Criteria For Coronary Revascularization in Patients With Stable Ischemic Heart Disease [facility] Volume 69, Issue 17, May 2017 High risk (>3% annual death or MI) 1.  Severe resting LV dysfunction change    Vitals:      I/O (24Hr): Intake/Output Summary (Last 24 hours) at 11/25/2020 1103  Last data filed at 11/25/2020 7706  Gross per 24 hour   Intake 714 ml   Output --   Net 714 ml       Labs:    URINE ANALYSIS: No results found for: LABURIN     CBC:  Lab Results   Component Value Date    WBC 6.2 11/25/2020    HGB 14.2 11/25/2020     11/25/2020        BMP:    Lab Results   Component Value Date     11/25/2020    K 4.7 11/25/2020     11/25/2020    CO2 27 11/25/2020    BUN 15 11/25/2020    CREATININE 0.99 11/25/2020    GLUCOSE 101 11/25/2020      LIVER PROFILE:  Lab Results   Component Value Date    ALT 41 11/25/2020    AST 26 11/25/2020    PROT 5.8 11/25/2020    BILITOT 0.34 11/25/2020    BILIDIR 0.01 01/18/2014    LABALBU 3.5 11/25/2020               Radiology:      Physical Examination:        General appearance:  alert, cooperative and no distress  Mental Status:  oriented to person, place and time and normal affect  Lungs:  clear to auscultation bilaterally, normal effort  Heart:  regular rate and rhythm, no murmur  Abdomen:  soft, nontender, nondistended, normal bowel sounds, no masses, hepatomegaly, splenomegaly  Extremities:  no edema, redness, tenderness in the calves  Skin:  no gross lesions, rashes, induration    Assessment:        Primary Problem  Chest heaviness    Active Hospital Problems    Diagnosis Date Noted    Chest heaviness [R07.89] 09/15/2017       Plan:        1. Patient mated with chest pain, which is completely resolved, history of coronary artery disease s/p stents. Stress test is positive  2. Awaiting final recommendation from cardiologist regarding heart cath  3. Hypertension, controlled  4. Patient is on DVT plus Lovenox  5. Kept n.p.o. today    Medications: Allergies:     Allergies   Allergen Reactions    Erythromycin [Erythromycin] Swelling     As child; all joints were swollen and very ill       Current Meds:   Scheduled Meds:    aspirin  81 mg Oral Dinner    atorvastatin  80 mg Oral Dinner    folic acid  1 mg Oral Dinner    lisinopril  2.5 mg Oral Dinner    omega-3 acid ethyl esters  1 g Oral Dinner    ticagrelor  60 mg Oral Dinner    sodium chloride flush  10 mL Intravenous 2 times per day    enoxaparin  30 mg Subcutaneous BID    metoprolol tartrate  25 mg Oral BID     Continuous Infusions:    sodium chloride 75 mL/hr at 11/24/20 0057     PRN Meds: sodium chloride flush, potassium chloride **OR** potassium alternative oral replacement **OR** potassium chloride, potassium chloride, magnesium sulfate, acetaminophen **OR** acetaminophen, magnesium hydroxide, promethazine **OR** ondansetron, nitroGLYCERIN, perflutren lipid microspheres, sodium chloride flush       Brooklyn Meadows MD  11/25/2020  11:03 AM

## 2022-03-10 ENCOUNTER — TELEPHONE (OUTPATIENT)
Dept: UROLOGY | Age: 57
End: 2022-03-10

## 2022-03-10 NOTE — TELEPHONE ENCOUNTER
Anastacio Giles from [facility] called for consult for pt. Room: 1  Phone: [phone]  Diagnosis: Blood in urine  Referring Physicians:     Message sent to Dr. Brown on call.

## 2022-04-05 ENCOUNTER — OFFICE VISIT (OUTPATIENT)
Dept: UROLOGY | Age: 57
End: 2022-04-05
Payer: COMMERCIAL

## 2022-04-05 ENCOUNTER — HOSPITAL ENCOUNTER (OUTPATIENT)
Age: 57
Setting detail: SPECIMEN
Discharge: HOME | End: 2022-04-05
Payer: COMMERCIAL

## 2022-04-05 VITALS
DIASTOLIC BLOOD PRESSURE: 90 MMHG | SYSTOLIC BLOOD PRESSURE: 142 MMHG | HEIGHT: 71 IN | WEIGHT: 220 LBS | BODY MASS INDEX: 30.8 KG/M2 | TEMPERATURE: 97.8 F

## 2022-04-05 DIAGNOSIS — R31.0 GROSS HEMATURIA: Primary | ICD-10-CM

## 2022-04-05 DIAGNOSIS — N40.0 BPH WITHOUT OBSTRUCTION/LOWER URINARY TRACT SYMPTOMS: ICD-10-CM

## 2022-04-05 DIAGNOSIS — R31.0 GROSS HEMATURIA: ICD-10-CM

## 2022-04-05 LAB
BACTERIA: NORMAL
CASTS UA: NORMAL /LPF
EPITHELIAL CELLS UA: NORMAL /HPF
RBC UA: NORMAL /HPF
WBC UA: NORMAL /HPF

## 2022-04-05 PROCEDURE — 99213 OFFICE O/P EST LOW 20 MIN: CPT | Performed by: UROLOGY

## 2022-04-05 PROCEDURE — 81015 MICROSCOPIC EXAM OF URINE: CPT

## 2022-04-05 RX ORDER — OMEGA-3/DHA/EPA/FISH OIL 300-1000MG
1 CAPSULE ORAL DAILY
COMMUNITY

## 2022-04-05 ASSESSMENT — ENCOUNTER SYMPTOMS
WHEEZING: 0
DIARRHEA: 0
BACK PAIN: 0
VOMITING: 0
COUGH: 0
EYE REDNESS: 0
EYE PAIN: 0
NAUSEA: 0
CONSTIPATION: 0
SHORTNESS OF BREATH: 0
ABDOMINAL PAIN: 0

## 2022-04-05 NOTE — PROGRESS NOTES
[address] [facility] [id number]  Dept: [address] [place] [address] Urology Office Note - Established    Patient:  Zainab Calzada  YOB: 1965  Date: 4/5/2022    The patient is a 49 y.o. male who presents todayfor evaluation of the following problems:   Chief Complaint   Patient presents with   [facility]     hospital fu , blood clot in bladder enlarged prostate        HPI  He is here in follow up for hematuria. He has admitted at [address] [place] [address] for hematuria. He needed a cysto with clot evacuation on that admission. He has some burning with urination. He has some sensation that he needs to void again. This was all about 2 weeks ago. Summary of old records: N/A    Additional History: N/A    Procedures Today: N/A    Urinalysis today:  No results found for this visit on 04/05/22. Last several PSA's:  Lab Results   Component Value Date    PSA 1.61 09/23/2020     Last total testosterone:  No results found for: TESTOSTERONE    AUA Symptom Score (4/5/2022):   INCOMPLETE EMPTYING: How often have you had the sensation of not emptying your bladder?: Less than 1 to 5 times  FREQUENCY: How often do you have to urinate less than every two hours?: Not at all  INTERMITTENCY: How often have you found you stopped and started again several times when you urinated?: About Half the time  URGENCY: How often have you found it difficult to postpone urination?: Almost always  WEAK STREAM: How often have you had a weak urinary stream?: Not at all  STRAINING: How often have you had to strain to start  urination?: Not at all  NOCTURIA: How many times did you typically get up at night to uriniate?: 3 Times  TOTAL I-PSS SCORE[de-identified] 12       Last BUN and creatinine:  Lab Results   Component Value Date    BUN 15 11/25/2020     Lab Results   Component Value Date    CREATININE 0.99 11/25/2020       Additional Lab/Culture results: none    Imaging Reviewed during this Office Visit: none  (results were independently reviewed by physician and radiology report verified)    PAST MEDICAL, FAMILY AND SOCIAL HISTORY UPDATE:  Past Medical History:   Diagnosis Date    [facility] [address]     Hyperlipidemia     Hypertension     Stented coronary artery 2014     Past Surgical History:   Procedure Laterality Date    APPENDECTOMY      BACK SURGERY      CARDIAC CATHETERIZATION  09/15/2017    HERNIA REPAIR      KNEE SURGERY Right     x 2    TONSILLECTOMY       No family history on file. Outpatient Medications Marked as Taking for the 4/5/22 encounter (Office Visit) with Arnaldo Willis MD   Medication Sig Dispense Refill    fish oil-omega-3 fatty acids 1000 MG capsule Take 1 g by mouth daily      metoprolol tartrate (LOPRESSOR) 25 MG tablet Take 1 tablet by mouth 2 times daily 60 tablet 3    isosorbide mononitrate (IMDUR) 60 MG extended release tablet Take 1 tablet by mouth daily 30 tablet 3    lisinopril (PRINIVIL;ZESTRIL) 2.5 MG tablet Take 2.5 mg by mouth Daily with supper       folic acid (FOLVITE) 1 MG tablet Take 1 mg by mouth Daily with supper       aspirin 81 MG chewable tablet Take 1 tablet by mouth daily. (Patient taking differently: Take 81 mg by mouth Daily with supper ) 30 tablet 11    nitroGLYCERIN (NITROSTAT) 0.4 MG SL tablet Place 1 tablet under the tongue every 5 minutes as needed for Chest pain. 25 tablet 6    atorvastatin (LIPITOR) 80 MG tablet Take 1 tablet by mouth nightly. (Patient taking differently: Take 80 mg by mouth Daily with supper ) 30 tablet 11    ticagrelor (BRILINTA) 90 MG TABS tablet Take 1 tablet by mouth 2 times daily. (Patient taking differently: Take 60 mg by mouth Daily with supper ) 60 tablet 11    omega-3 acid ethyl esters (LOVAZA) 1 G capsule Take 2 capsules by mouth 2 times daily.  (Patient taking differently: Take 1 g by mouth Daily with supper ) 60 capsule 0 Erythromycin [erythromycin]  Social History     Tobacco Use   Smoking Status Former Smoker    Packs/day: 0.25    Quit date: 2014    Years since quittin.5   Smokeless Tobacco Never Used     (Ifpatient a smoker, smoking cessation counseling offered)    Social History     Substance and Sexual Activity   Alcohol Use Yes    Comment: social       REVIEW OF SYSTEMS:  Review of Systems    Physical Exam:      Vitals:    22 1032   BP: (!) 142/90   Temp: 97.8 °F (36.6 °C)     Body mass index is 30.68 kg/m². Patient is a 49 y.o. male in no acute distress and alert and oriented to person, place and time. Physical Exam  Constitutional: Patient in no acute distress. Neuro: Alert and oriented to person, place and time. Psych: Mood normal, affect normal  Lungs: Respiratory effort is normal  Cardiovascular: Warm & Pink  Abdomen: Soft, non-tender, non-distended with no CVA,  No flank tenderness,  Or hepatosplenomegaly   Lymphatics: No palpablelymphadenopathy. Bladder non-tender and not distended. Musculoskeletal: Normal gait and station      Assessment and Plan      1. Gross hematuria    2. BPH without obstruction/lower urinary tract symptoms           Plan:          He is doing better. CTU was negative. Cysto was negative, except for one area, which was biopsied and negative. BPH noted as well. Return in about 3 months (around 2022). Prescriptions Ordered:  No orders of the defined types were placed in this encounter. Orders Placed:  No orders of the defined types were placed in this encounter. Jin Duong MD    Agree with the ROS entered by the MA.

## 2022-07-05 ENCOUNTER — OFFICE VISIT (OUTPATIENT)
Dept: UROLOGY | Age: 57
End: 2022-07-05
Payer: COMMERCIAL

## 2022-07-05 VITALS
BODY MASS INDEX: 31.5 KG/M2 | WEIGHT: 225 LBS | OXYGEN SATURATION: 98 % | HEIGHT: 71 IN | HEART RATE: 47 BPM | DIASTOLIC BLOOD PRESSURE: 98 MMHG | SYSTOLIC BLOOD PRESSURE: 158 MMHG | TEMPERATURE: 97.2 F

## 2022-07-05 DIAGNOSIS — N40.0 BPH WITHOUT OBSTRUCTION/LOWER URINARY TRACT SYMPTOMS: ICD-10-CM

## 2022-07-05 DIAGNOSIS — R31.0 GROSS HEMATURIA: Primary | ICD-10-CM

## 2022-07-05 PROCEDURE — 99212 OFFICE O/P EST SF 10 MIN: CPT | Performed by: UROLOGY

## 2022-07-05 ASSESSMENT — ENCOUNTER SYMPTOMS
SHORTNESS OF BREATH: 0
SORE THROAT: 0
VOMITING: 0
NAUSEA: 0
COUGH: 0
WHEEZING: 0
DIARRHEA: 0

## 2022-07-05 NOTE — PROGRESS NOTES
Review of Systems   Constitutional: Negative for chills, fatigue and fever. HENT: Negative for congestion and sore throat. Respiratory: Negative for cough, shortness of breath and wheezing. Cardiovascular: Negative for chest pain and palpitations. Gastrointestinal: Negative for diarrhea, nausea and vomiting. Genitourinary: Negative for difficulty urinating, dysuria, frequency, hematuria and urgency.

## 2022-07-05 NOTE — PROGRESS NOTES
tablet by mouth 2 times daily 60 tablet 3    isosorbide mononitrate (IMDUR) 60 MG extended release tablet Take 1 tablet by mouth daily 30 tablet 3    lisinopril (PRINIVIL;ZESTRIL) 2.5 MG tablet Take 2.5 mg by mouth Daily with supper       folic acid (FOLVITE) 1 MG tablet Take 1 mg by mouth Daily with supper       aspirin 81 MG chewable tablet Take 1 tablet by mouth daily. (Patient taking differently: Take 81 mg by mouth Daily with supper ) 30 tablet 11    nitroGLYCERIN (NITROSTAT) 0.4 MG SL tablet Place 1 tablet under the tongue every 5 minutes as needed for Chest pain. 25 tablet 6    atorvastatin (LIPITOR) 80 MG tablet Take 1 tablet by mouth nightly. (Patient taking differently: Take 80 mg by mouth Daily with supper ) 30 tablet 11    omega-3 acid ethyl esters (LOVAZA) 1 G capsule Take 2 capsules by mouth 2 times daily. (Patient taking differently: Take 1 g by mouth Daily with supper ) 60 capsule 0       Erythromycin [erythromycin]  Social History     Tobacco Use   Smoking Status Former Smoker    Packs/day: 0.25    Quit date: 2014    Years since quittin.8   Smokeless Tobacco Never Used     (Ifpatient a smoker, smoking cessation counseling offered)    Social History     Substance and Sexual Activity   Alcohol Use Yes    Comment: social       REVIEW OF SYSTEMS:  Review of Systems    Physical Exam:      Vitals:    22 1054   BP: (!) 158/98   Pulse: (!) 47   Temp: 97.2 °F (36.2 °C)   SpO2: 98%     Body mass index is 31.38 kg/m². Patient is a 49 y.o. male in no acute distress and alert and oriented to person, place and time. Physical Exam  Constitutional: Patient in no acute distress. Neuro: Alert and oriented to person, place and time. Psych: Mood normal, affect normal  Lungs: Respiratory effort is normal  Cardiovascular: Warm & Pink  Abdomen: Soft, non-tender, non-distended with no CVA,  No flank tenderness,  Or hepatosplenomegaly   Lymphatics: No palpablelymphadenopathy.   Bladder non-tender and not distended. Musculoskeletal: Normal gait and station      Assessment and Plan      1. Gross hematuria    2. BPH without obstruction/lower urinary tract symptoms           Plan:          He is doing well. CTU, cysto and biopsy were negative. No recent hematuria. F/U in 6 months with a U/A. Return in about 6 months (around 1/5/2023). Prescriptions Ordered:  No orders of the defined types were placed in this encounter. Orders Placed:  No orders of the defined types were placed in this encounter. Elder Clancy MD    Agree with the ROS entered by the MA.

## 2023-01-10 ENCOUNTER — OFFICE VISIT (OUTPATIENT)
Dept: UROLOGY | Age: 58
End: 2023-01-10
Payer: COMMERCIAL

## 2023-01-10 VITALS
HEART RATE: 71 BPM | HEIGHT: 71 IN | SYSTOLIC BLOOD PRESSURE: 116 MMHG | TEMPERATURE: 98.6 F | DIASTOLIC BLOOD PRESSURE: 58 MMHG | BODY MASS INDEX: 30.8 KG/M2 | WEIGHT: 220 LBS

## 2023-01-10 DIAGNOSIS — Z87.448 HISTORY OF HEMATURIA: Primary | ICD-10-CM

## 2023-01-10 DIAGNOSIS — Z12.5 PROSTATE CANCER SCREENING: ICD-10-CM

## 2023-01-10 LAB
BILIRUBIN, POC: NORMAL
BLOOD URINE, POC: NORMAL
CLARITY, POC: CLEAR
COLOR, POC: YELLOW
GLUCOSE URINE, POC: NORMAL
KETONES, POC: NORMAL
LEUKOCYTE EST, POC: NORMAL
NITRITE, POC: NORMAL
PH, POC: NORMAL
PROTEIN, POC: NORMAL
SPECIFIC GRAVITY, POC: NORMAL
UROBILINOGEN, POC: NORMAL

## 2023-01-10 PROCEDURE — 81002 URINALYSIS NONAUTO W/O SCOPE: CPT | Performed by: UROLOGY

## 2023-01-10 PROCEDURE — 99212 OFFICE O/P EST SF 10 MIN: CPT | Performed by: UROLOGY

## 2023-01-10 PROCEDURE — 3074F SYST BP LT 130 MM HG: CPT | Performed by: UROLOGY

## 2023-01-10 PROCEDURE — 3078F DIAST BP <80 MM HG: CPT | Performed by: UROLOGY

## 2023-01-10 ASSESSMENT — ENCOUNTER SYMPTOMS
CONSTIPATION: 0
ABDOMINAL PAIN: 0
WHEEZING: 0
DIARRHEA: 0
BACK PAIN: 0
EYE PAIN: 0
SHORTNESS OF BREATH: 0
NAUSEA: 0
VOMITING: 0
EYE REDNESS: 0
COUGH: 0

## 2023-01-10 NOTE — PROGRESS NOTES
[address] [address]  Dept: [address] [facility] Urology Office Note - Established    Patient:  Justin Cassidy  YOB: 1965  Date: 1/10/2023    The patient is a 56 y.o. male who presents todayfor evaluation of the following problems:   Chief Complaint   Patient presents with    Other     6 month f/u        HPI  Here in follow up for hematuria. He is voiding well. No prostate meds. Work up for hematuria was negative. He is a former smoker. Summary of old records: N/A    Additional History: N/A    Procedures Today: N/A    Urinalysis today:  No results found for this visit on 01/10/23. Last several PSA's:  Lab Results   Component Value Date    PSA 1.61 09/23/2020     Last total testosterone:  No results found for: TESTOSTERONE    AUA Symptom Score (1/10/2023):   INCOMPLETE EMPTYING: How often have you had the sensation of not emptying your bladder?: Not at all  FREQUENCY: How often do you have to urinate less than every two hours?: Not at all  INTERMITTENCY: How often have you found you stopped and started again several times when you urinated?: Less than Half the time  URGENCY: How often have you found it difficult to postpone urination?: Not at all  WEAK STREAM: How often have you had a weak urinary stream?: Less than 1 to 5 times  STRAINING: How often have you had to strain to start  urination?: Not at all  NOCTURIA: How many times did you typically get up at night to uriniate?: NONE  TOTAL I-PSS SCORE[de-identified] 3  How would you feel if you were to spend the rest of your life with your urinary condition?: Unhappy    Last BUN and creatinine:  Lab Results   Component Value Date    BUN 15 11/25/2020     Lab Results   Component Value Date    CREATININE 0.99 11/25/2020       Additional Lab/Culture results: none    Imaging Reviewed during this Office Visit: none  (results were independently reviewed by physician and radiology report verified)    PAST MEDICAL, FAMILY AND SOCIAL HISTORY UPDATE:  Past Medical History:   Diagnosis Date    Heart attack ([facility] [address]     Hyperlipidemia     Hypertension     Stented coronary artery 2014     Past Surgical History:   Procedure Laterality Date    APPENDECTOMY      BACK SURGERY      CARDIAC CATHETERIZATION  09/15/2017    HERNIA REPAIR      KNEE SURGERY Right     x 2    TONSILLECTOMY       History reviewed. No pertinent family history. Outpatient Medications Marked as Taking for the 1/10/23 encounter (Office Visit) with Sandra Loo MD   Medication Sig Dispense Refill    fish oil-omega-3 fatty acids 1000 MG capsule Take 1 g by mouth daily      metoprolol tartrate (LOPRESSOR) 25 MG tablet Take 1 tablet by mouth 2 times daily 60 tablet 3    isosorbide mononitrate (IMDUR) 60 MG extended release tablet Take 1 tablet by mouth daily 30 tablet 3    lisinopril (PRINIVIL;ZESTRIL) 2.5 MG tablet Take 2.5 mg by mouth Daily with supper       folic acid (FOLVITE) 1 MG tablet Take 1 mg by mouth Daily with supper       aspirin 81 MG chewable tablet Take 1 tablet by mouth daily. (Patient taking differently: Take 81 mg by mouth Daily with supper) 30 tablet 11    nitroGLYCERIN (NITROSTAT) 0.4 MG SL tablet Place 1 tablet under the tongue every 5 minutes as needed for Chest pain. 25 tablet 6    atorvastatin (LIPITOR) 80 MG tablet Take 1 tablet by mouth nightly. (Patient taking differently: Take 80 mg by mouth Daily with supper) 30 tablet 11    ticagrelor (BRILINTA) 90 MG TABS tablet Take 1 tablet by mouth 2 times daily. 60 tablet 11    omega-3 acid ethyl esters (LOVAZA) 1 G capsule Take 2 capsules by mouth 2 times daily.  (Patient taking differently: Take 1 g by mouth Daily with supper) 60 capsule 0       Erythromycin [erythromycin]  Social History     Tobacco Use   Smoking Status Former    Packs/day: 0.25    Types: Cigarettes    Quit date: 9/14/2014    Years since quitting: 8.3   Smokeless Tobacco Never     (Ifpatient a smoker, smoking cessation counseling offered)    Social History     Substance and Sexual Activity   Alcohol Use Yes    Comment: social       REVIEW OF SYSTEMS:  Review of Systems    Physical Exam:      Vitals:    01/10/23 1016   BP: (!) 116/58   Pulse: 71   Temp: 98.6 °F (37 °C)     Body mass index is 30.68 kg/m². Patient is a 56 y.o. male in no acute distress and alert and oriented to person, place and time. Physical Exam  Constitutional: Patient in no acute distress. Neuro: Alert and oriented to person, place and time. Psych: Mood normal, affect normal    Assessment and Plan      1. History of hematuria    2. Prostate cancer screening           Plan:         Doing well  Work up for hematuria was negative. U/A today. PSA ordered. F/U in 1 year. Return in about 1 year (around 1/10/2024). Prescriptions Ordered:  No orders of the defined types were placed in this encounter. Orders Placed:  Orders Placed This Encounter   Procedures    PSA Screening     Standing Status:   Future     Standing Expiration Date:   1/10/2024             Nicholas Velez MD    Agree with the ROS entered by the MA.

## 2023-02-07 ENCOUNTER — HOSPITAL ENCOUNTER (OUTPATIENT)
Dept: GENERAL RADIOLOGY | Age: 58
Discharge: HOME | End: 2023-02-09

## 2023-02-07 ENCOUNTER — HOSPITAL ENCOUNTER (OUTPATIENT)
Age: 58
Discharge: HOME | End: 2023-02-07

## 2023-02-07 DIAGNOSIS — T14.90XA INJURY: ICD-10-CM

## 2023-02-07 PROCEDURE — 73010 X-RAY EXAM OF SHOULDER BLADE: CPT

## 2023-02-15 ENCOUNTER — HOSPITAL ENCOUNTER (OUTPATIENT)
Dept: PHYSICAL THERAPY | Age: 58
Setting detail: THERAPIES SERIES
Discharge: HOME | End: 2023-02-15
Payer: COMMERCIAL

## 2023-02-15 PROCEDURE — 97110 THERAPEUTIC EXERCISES: CPT

## 2023-02-15 PROCEDURE — 97162 PT EVAL MOD COMPLEX 30 MIN: CPT

## 2023-02-15 PROCEDURE — 97016 VASOPNEUMATIC DEVICE THERAPY: CPT

## 2023-02-15 NOTE — THERAPY EVALUATION
[x] [facility]) - [facility] & Therapy  [address]  [place]: [phone]   F: [phone]     Physical Therapy Upper Extremity Evaluation    Date:  2/15/2023  Patient: Carmelina Ugalde  : 1965  MRN: [id number]  Physician: Lela Jiang MD   Insurance: [facility] -- Generic Self-insured -- 3x/wk for 3 weeks -3/9  Medical Diagnosis:   S46.111A (ICD-10-CM) - Strain of muscle, fascia and tendon of long head of biceps, right arm, initial encounter   25 [id number] (ICD-10-CM) - Strain of muscle(s) and tendon(s) of the rotator cuff of right shoulder, initial encounter      Onset Date: 23   Next 's appt: 23 Occupational health     Precautions: none     Subjective:   CC: R shoulder pain     Pt notes at work on 23 that he was lifting a heavy bucket when he heard a snap in the shoulder having increased in the R shoulder. Pt then followed with plant nurse whom sent to occupational health. Was told then he had a R RTC and biceps tendon strain. He is only taking intermittent tylenol and motrin. Pain has been consistent and increases with raising the RUE.          PMHx: [] Unremarkable [] Diabetes [] HTN  [] Pacemaker   [x] MI/Heart Problems (9 years ago) [] Cancer [] Arthritis [x] Other: lumbar surgeries x2, R knee meniscus surgery x2               [x] Refer to full medical chart  In EPIC     Comorbidities:   [x] Obesity [] Dialysis  [] Other:   [] Asthma/COPD [] Dementia [] Other:   [] Stroke [] Sleep apnea [] Other:   [] Vascular disease [] Rheumatic disease [] Other:     Preferred Language:   [x] English           [] Other:    Function:  Hand Dominance  [x] Right  [] Left    Pain:  [x] Yes  [] No Location: R anterior shoulder  Pain Rating: (0-10 scale) 3/10  Pain altered Tx:  [] Yes  [] No  Action:  - with raising the arm gets to 9-10/10     Symptoms:  [] Improving [] Worsening [x] Same  Aggravating factors: raising the arm overhead   Alleviating factors: rest, limiting activity. Prior Imaging:   Xray 2/7/23     FINDINGS:   There is no evidence of acute fracture. There is normal alignment. No acute   joint abnormality. No focal osseous lesion. No focal soft tissue abnormality.         Previous Treatment:   - icing -- not much difference      Medications: [x] Refer to full medical record [] None [] Other:  Allergies:      [x] Refer to full medical record [] None [] Other:      Work Status: [employer] - still working full time-- not doing lifting, pushing, pulling or climbing -- keeping most things in front of him      Prior Level of Function:     Functional Status Previous level of function Current level of function Comments   Bathing  [x] Independent  [] Deficit [] Independent  [x] Deficit    Dress/grooming [x] Independent  [] Deficit [] Independent  [x] Deficit Hurts to take shirts off, difficulty with coats    Driving [x] Independent  [] Deficit [x] Independent  [] Deficit    Housekeeping/Meal Preparation [x] Independent  [] Deficit [x] Independent  [] Deficit Limits overhead    Lifting/Carrying [x] Independent  [] Deficit [] Independent  [x] Deficit Not raising overhead    Reaching [x] Independent  [] Deficit [] Independent  [x] Deficit Not raising overhead   Grasping [x] Independent  [] Deficit [x] Independent  [] Deficit    Writing/Typing [x] Independent  [] Deficit [x] Independent  [] Deficit    Other:  [] Independent  [] Deficit [] Independent  [] Deficit      Patient Goals/Rehab Expectations: to improve pain and mobility          Objective:    Observations:   OBSERVATION No Deficit Deficit Not Tested Comments   Forward Head [x]  []  []      Rounded Shoulders [x]  []  []      Kyphosis []  [x]  []  mild    Scap Height/Position [x]  []  []      Winging []  [x]  []  Slightly more prominent R vs L     SH Rhythm []  []  []      INSPECTION/PALPATION           SC/AC Joint []  [x]  []  At R acromion     Supraspinatus [x]  []  []     Biceps tendon/groove []  [x]  []  Popping and clicking    Posterior shld [x]  []  []     Subscapularis []  [x]  []  New pain today     NEUROLOGICAL           Cervical ROM/Quadrant [x]  []  []      Reflexes []  []  [x]      Compression/Distraction [x]  []  []      Sensation [x]  []  []         Fall risk:  [x] No            [] Yes:       sitting  Range of Motion  Left Range of Motion  Right Strength  Left Strength  Right   Shoulder Flexion 175 A: 90 (pain)   P: 155 5 4 pain   Shoulder Abduction 180 A:60 (pain, limited in holding there)  P: 80 - gets a pop   3- limited by pain    Shoulder Extension 80 50     Shoulder ER 70 60 5 3- limited by pain    Shoulder IR 60 50 (pain) 5 4+   Elbow Flexion   5 5   Elbow Extension   5 5   Pronation       Supination       Wrist Flexion       Wrist Extension       Radial Deviation       Ulnar Deviation       Apley- Cross Body Reach       Apley- Gross ER       Apley- Gross IR        Strength              Supraspinatus       Upper Trap       Middle Trap       Lower Trap       Pec Major       Serratus Anterior           SHOULDER SPECIAL TESTS Left Right   Corky Leriche + []         - []           NT []  + []         - [x]           NT []    Empty Can + []         - []           NT []  + [x]         - []           NT []    Neer Impingement + []         - []           NT []  + []         - [x]           NT []    Painful Arc + []         - []           NT []  + [x]         - []           NT []   Pain mostly at 90 deg    Drop Arm + []         - []           NT []  + []         - []           NT []    ER Lag Sign + []         - []           NT []  + []         - []           NT []    IR Lag Sign + []         - []           NT []  + []         - []           NT []    Modified Labral Shear + []         - []           NT []  + []         - []           NT []    Crank Test + []         - []           NT []  + []         - []           NT []    Speed's + []         - []           NT []  + []         - []           NT [] Yergason's + []         - []           NT []  + []         - []           NT []    Pinal's + []         - []           NT []  + []         - []           NT []    Biceps Load II + []         - []           NT []  + []         - [x]           NT []    Other: belly press + []         - []           NT []  + [x]         - []           NT []      ELBOW SPECIAL TESTS Left Right   Varus Stress + []         - []           NT []  + []         - []           NT []    Valgus Stress + []         - []           NT []  + []         - []           NT []    Mill's Test + []         - []           NT []  + []         - []           NT []    Cozen's Test + []         - []           NT []  + []         - []           NT []    Maudsley's Test + []         - []           NT []  + []         - []           NT []    Other:  + []         - []           NT []  + []         - []           NT []      WRIST/HAND SPECIAL TESTS Left Right   Finklestein's Test + []         - []           NT []  + []         - []           NT []    Scaphoid Shift Test + []         - []           NT []  + []         - []           NT []      NEURAL/VASCULAR TESTING Left Right   ULTT- Ulnar Nerve Bias + []         - []           NT []  + []         - []           NT []    ULTT- Median Nerve Bias + []         - []           NT []  + []         - []           NT []    ULTT- Radial Nerve Bias + []         - []           NT []  + []         - []           NT []    Tinel's Sign + []         - []           NT []  + []         - []           NT []    Phalen's Test + []         - []           NT []  + []         - []           NT []    Adson's Test + []         - []           NT []  + []         - []           NT []    Nicole's Test + []         - []           NT []  + []         - []           NT []    Farheen Dark + []         - []           NT []  + []         - []           NT []    Other:  + []         - []           NT []  + []         - []           NT [] Muscle Length Restrictions:     Joint Mobility:    Gait: Independent []           Modified Independent []            Not independent []  Comments:       Assessment:    Pt presents with R shoulder pain from a work injury  2/6/23. Based on his presentation it is consistent with RTC & likely bicep strain. Do here some popping clicking and get catching with abduction so there could potential be some labral dysfunction but this does not change POC. Pt has limited R Shoulder ROM, pain with arm elevation, and mild weakness in the R shoulder which is affecting his function and abilities to perform work duties. Feel the patient would continue to benefit from skilled physical therapy in order to: modulate pain, improve strength, and improve/maintain ROM to better tolerate ADL and work activities. Will continue to assess and monitor how pt is progressing. Problems:    [x] ? Pain:  [x] ? ROM:  [x] ? Strength:  [x] ? Function:  [] Other:      STG/LTG: (to be met in 9 treatments)  Pt will demonstrate improved R UE strength to 5/5 or greater in order to demonstrate improved stability/strength necessary for unrestricted ADLs/work activities  Pt will self report worst pain no greater than 3/10 with overhead reaching in order to better tolerate ADLs/work activities with minimal dysfunction  Pt will be able to flexion and abduct R shoulder to > 130 deg actively to demonstrate the range needed to lift overhead which is a necessary work duty.    Pt will decrease functional limitation per SPADI to <25% in order to demonstrate improved functional tolerances at PLOF with minimal restriction/dysfunction  Pt will demonstrate independence with a long term HEP for continued progress/maintenance after completion of PT      Functional Assessment Used: SPADI  Current Status Score: 50/130 = 38% functional limitation   Goal Status Score: < 25% functional limitation     Evaluation Complexity:  History (Personal factors, comorbidities) [] 0 [x] 1-2 [] 3+   Exam (limitations, restrictions) [] 1-2 [x] 3 [] 4+   Clinical presentation (progression) [] Stable [x] Evolving  [] Unstable   Decision Making [] Low [x] Moderate [] High    [] Low Complexity [x] Moderate Complexity [] High Complexity     Rehab Potential:  [x] Good  [] Fair  [] Poor   Suggested Professional Referral:  [x] No  [] Yes:  Barriers to Goal Achievement[de-identified]  [x] No  [] Yes:  Domestic Concerns:  [x] No  [] Yes:    Pt. Education:  [x] Plans/Goals, Risks/Benefits discussed  [x] Home exercise program  Method of Education: [x] Verbal  [x] Demo  [x] Written  Comprehension of Education:  [x] Verbalizes understanding. [x] Demonstrates understanding. [] Needs Review. [x] Demonstrates/verbalizes understanding of HEP/Ed previously given.     Treatment Plan:  [x] Therapeutic Exercise   25575  [] Iontophoresis: 4 mg/mL Dexamethasone Sodium Phosphate  mAmin  42056   [x] Therapeutic Activity  32878 [x] Vasopneumatic cold with compression  44103    [] Gait Training   90239 [] Ultrasound   74892   [x] Neuromuscular Re-education  25679 [] Electrical Stimulation Unattended  36511   [x] Manual Therapy  25887 [] Electrical Stimulation Attended  39176   [x] Instruction in HEP  [] Lumbar/Cervical Traction  18340   [] Aquatic Therapy   34551 [x] Cold/hotpack    [] Massage   43836      [] Dry Needling, 1 or 2 muscles  88454   [] Biofeedback, first 15 minutes   91924  [] Biofeedback, additional 15 minutes   38285 [] Dry Needling, 3 or more muscles  84611          Frequency: 3 x/week for 9 total visits    Todays Treatment:  INTERVENTIONS  Reps/ Time Weight/ Level Completed  Today Comments          MODALITIES        Vasocompression _ R shoulder 10 min  Mod, 34 deg x           MANUAL                      EXERCISES        Supine AAROM flexion  10x   x    Supine AAROM ABD  10x  x    Supine AAROM ER  10x  x At 60 deg ABD    Serratus punches  10x   x    Other:     Patient Education/HEP:   - 2/15: gave HEP of the above exercises     Specific Instructions for next treatment: UBE, pulleys, ROM, scap activation, pain free range strength      Treatment Charges: Mins Units Time in/Time out    [x] Evaluation       []  Low       [x]  Moderate       []  High 35 1 1706 -1741   []  Modalities      [x]  Ther Exercise 9 1 5869-1736   []  Manual Therapy      []  Ther Activities      []  Aquatics      []  Neuromuscular      []  Gait Training      []  Dry Needling           1-2 muscles      []  Dry Needling           3 or more muscles      [x] Vasocompression 10 1 2455-7426   []  Other        TOTAL TREATMENT TIME: 54  Billed treatment time: 19 min     Time in: 5:06p    Time Out: 6:00    Electronically signed by:   Desean Brewster PT, DPT   #[id number]    Thank you for referring this patient to physical therapy. Please feel free to contact with any questions or concerns with plan of care.

## 2023-02-17 ENCOUNTER — HOSPITAL ENCOUNTER (OUTPATIENT)
Dept: PHYSICAL THERAPY | Age: 58
Setting detail: THERAPIES SERIES
Discharge: HOME | End: 2023-02-17
Payer: COMMERCIAL

## 2023-02-17 PROCEDURE — 97110 THERAPEUTIC EXERCISES: CPT

## 2023-02-17 PROCEDURE — 97140 MANUAL THERAPY 1/> REGIONS: CPT

## 2023-02-17 PROCEDURE — 97016 VASOPNEUMATIC DEVICE THERAPY: CPT

## 2023-02-17 NOTE — FLOWSHEET NOTE
[] [facility]) - [facility] & Therapy  [address]  [place]: [phone]   F: [phone]    Physical Therapy Daily Treatment Note    Date:  2023  Patient Name:  Saurabh Abel    :  1965  MRN: [id number]  Physician: Leesa Lombard, MD                          Insurance: [facility] -- Generic Self-insured -- 3x/wk for 3 weeks -3/9  Medical Diagnosis:   S46.111A (ICD-10-CM) - Strain of muscle, fascia and tendon of long head of biceps, right arm, initial encounter   25 [id number] (ICD-10-CM) - Strain of muscle(s) and tendon(s) of the rotator cuff of right shoulder, initial encounter                 Onset Date: 23                 Next 's appt: 23 Occupational health     Visit# / total visits:   Cancels/No Shows: 0/0    Subjective:  Patient states he has been doing a lot of \"compensating\" for at home ADL such as diving into tshirt, minimal over head lifting,and \"chicken wing\" arm lift when absolutely necessary. Pain:  [x] Yes  [] No Location: R shoulder  Pain Rating: (0-10 scale) 0-1/10  Pain altered Tx:  [] No  [] Yes  Action:    Objective:  Modalities: vaso compression 15' 34* MOD pressure in sitting   Precautions:  Exercises:  Exercise Reps/ Time Weight/ Level Completed  Today Comments   UBE 3'  x Fwd/back   Pulleys flex/scap/abd  20x ea   x    Wall walks flex/abd/scap  5x5\" hold   x    Scap retraction with towel  10x5\"  2 sets  x    B ER with towel  10x5\"  2 sets   x    Scap shapes on wall  20x  ea  x Cw/ccw/up/down/side-side use of pillow case           Manual  10'  x PROM all planes, light distraction, A/P mob anterior shoulder joint      Specific Instructions for next treatment: ROM, scap activation, pain free range strength  - total Gym I,T,Y, retraction, 3 way bicep curl     Assessment:   :Patient able to complete all exercise to tolerance with pain in all active ranges this date.  Patient required increased time with all exercises secondary to patient having slow guarded movement especially in immediate time of session. Added exercises for scapular strengthening with most minimal pain during movements but overall fatigue in shoulder joint. Manual intervention completed to promote shoulder mobility in greater ranges with pain only with descending in flexion/abduction. Continued with vaso compression for pain and edema control. [x] Progressing toward goals. [] No change. [] Other:    [] Patient would continue to benefit from skilled physical therapy services in order to: modulate pain, improve strength, and improve/maintain ROM to better tolerate ADL and work activities. Will continue to assess and monitor how pt is progressing. STG/LTG: (to be met in 9 treatments)  Pt will demonstrate improved R UE strength to 5/5 or greater in order to demonstrate improved stability/strength necessary for unrestricted ADLs/work activities  Pt will self report worst pain no greater than 3/10 with overhead reaching in order to better tolerate ADLs/work activities with minimal dysfunction  Pt will be able to flexion and abduct R shoulder to > 130 deg actively to demonstrate the range needed to lift overhead which is a necessary work duty. Pt will decrease functional limitation per SPADI to <25% in order to demonstrate improved functional tolerances at PLOF with minimal restriction/dysfunction  Pt will demonstrate independence with a long term HEP for continued progress/maintenance after completion of PT    Pt. Education:  [] Yes  [] No  [] Reviewed Prior HEP/Ed  Method of Education: [] Verbal  [] Demo  [] Written  Comprehension of Education:  [] Verbalizes understanding. [] Demonstrates understanding. [] Needs review. [] Demonstrates/verbalizes HEP/Ed previously given.     EXERCISES - HEP 2/15/23       Supine AAROM flexion  10x   daily   Supine AAROM ABD  10x  daily   Supine AAROM ER At 60 deg ABD  10x  daily   Serratus punches  10x   daily         Plan: [x] Continue per plan of care.    [] Other:       Treatment Charges: Mins Units Time In/Out   [] Evaluation       []  Low       []  Moderate       []  High      []  Modalities        [x]  Ther Exercise 30 2 915-945 am    []  Neuromuscular Re-ed      []  Gait Training      [x]  Manual Therapy 10 1 945-955 am   []  Ther Activities      []  Aquatics      [x]  Vasocompression 15 1 955-1010 am    []  Cervical Traction      []  Other      Total Treatment time 55 min 4 915-1010 am        Time In:915 am             Time Out: 1010 am    Electronically signed by:  Gypsy Lam PTA

## 2023-02-20 ENCOUNTER — HOSPITAL ENCOUNTER (OUTPATIENT)
Dept: PHYSICAL THERAPY | Age: 58
Setting detail: THERAPIES SERIES
Discharge: HOME | End: 2023-02-20
Payer: COMMERCIAL

## 2023-02-20 PROCEDURE — 97016 VASOPNEUMATIC DEVICE THERAPY: CPT

## 2023-02-20 PROCEDURE — 97110 THERAPEUTIC EXERCISES: CPT

## 2023-02-20 PROCEDURE — 97140 MANUAL THERAPY 1/> REGIONS: CPT

## 2023-02-20 NOTE — FLOWSHEET NOTE
[x] [facility]) - [facility] & Therapy  [address]  [place]: [phone]   F: [phone]    Physical Therapy Daily Treatment Note    Date:  2023  Patient Name:  Ihor Gosselin    :  1965  MRN: [id number]  Physician: Sania Doherty MD                          Insurance: [facility] -- Generic Self-insured -- 3x/wk for 3 weeks -3/9  Medical Diagnosis:   S46.111A (ICD-10-CM) - Strain of muscle, fascia and tendon of long head of biceps, right arm, initial encounter   25 [id number] (ICD-10-CM) - Strain of muscle(s) and tendon(s) of the rotator cuff of right shoulder, initial encounter                 Onset Date: 23                 Next 's appt: 23 Occupational health     Visit# / total visits: 3/9  Cancels/No Shows: 0/0    Subjective: Patient reporting to therapy with no new concerns or complaints. Patient reporting he felt like he ran a marathon with his arm post last session. Patient reporting the soreness lasted for several hours.     Pain:  [x] Yes  [] No Location: R shoulder  Pain Rating: (0-10 scale) 0-1/10  Pain altered Tx:  [] No  [] Yes  Action:    Objective:  Precautions: none   INTERVENTIONS  Reps/ Time Weight/ Level Completed  Today Comments   MODALITIES       Vaso-compression _ R shoulder  10 min  34 deg, mod  x Sitting                  UBE 2'/2'  x Fwd/back   Pulleys flex/scap/abd  20x ea   x    PROM 8'  x    Wall walks flex/abd/scap  5x5\" hold   x    Upper trap stretch 2x 30\"  x 20 added   Levator stretch 2x30\"  x 2/20 added   3 way bicep curl   add                  Seated AROM    Add    Scap retraction with towel  10x5\"  2 sets      B ER with towel  10x5\"  2 sets       Scap shapes on wall  20x  ea  x Cw/ccw/up/down/side-side use of pillow case    Prone I, Y, T   Add                                Manual       Manual  10'  x  light distraction, A/P, inferior mob anterior shoulder joint    MFR  5'  x Bicep, subscap     Measurements  Position     Flexion ABD     Scaption     ER         Specific Instructions for next treatment: ROM, scap activation, pain free range strength  -     Assessment:   2/20: Added manual this session to decrease tightness and to improve tolerance to activity. Continued with ROM stretching with minimal change noted post manual.  Patient did note improved tolerance to shoulder ABD with assisted shoulder depression. Patient c/o increased soreness in his neck and was provided with seated cervical stretches to decrease pain and improve mobility. Patient could not tolerance over pressure with cervical stretches this date as he had increased pain with returning to neutral.  Completed session with [facility] for pain and edema management. [x] Progressing toward goals. [] No change. [] Other:    [x] Patient would continue to benefit from skilled physical therapy services in order to: modulate pain, improve strength, and improve/maintain ROM to better tolerate ADL and work activities. Will continue to assess and monitor how pt is progressing. STG/LTG: (to be met in 9 treatments)  Pt will demonstrate improved R UE strength to 5/5 or greater in order to demonstrate improved stability/strength necessary for unrestricted ADLs/work activities  Pt will self report worst pain no greater than 3/10 with overhead reaching in order to better tolerate ADLs/work activities with minimal dysfunction  Pt will be able to flexion and abduct R shoulder to > 130 deg actively to demonstrate the range needed to lift overhead which is a necessary work duty. Pt will decrease functional limitation per SPADI to <25% in order to demonstrate improved functional tolerances at PLOF with minimal restriction/dysfunction  Pt will demonstrate independence with a long term HEP for continued progress/maintenance after completion of PT    Pt.  Education:  [] Yes  [] No  [] Reviewed Prior HEP/Ed  Method of Education: [] Verbal  [] Demo  [] Written  Comprehension of Education:  [] Verbalizes understanding. [] Demonstrates understanding. [] Needs review. [] Demonstrates/verbalizes HEP/Ed previously given. EXERCISES - HEP 2/15/23       Supine AAROM flexion  10x   daily   Supine AAROM ABD  10x  daily   Supine AAROM ER At 60 deg ABD  10x  daily   Serratus punches  10x   daily         Plan: [x] Continue per plan of care.    [] Other:       Treatment Charges: Mins Units Time In/Out   [] Evaluation       []  Low       []  Moderate       []  High      []  Modalities        [x]  Ther Exercise 35 2 8773-7356   []  Neuromuscular Re-ed      []  Gait Training      [x]  Manual Therapy 15 1 4072-7449   []  Ther Activities      []  Aquatics      [x]  Vasocompression 10 1 3649-8205   []  Cervical Traction      []  Other      Total Treatment time 60 4 9534-0683        Time In:1530           Time Out: 8353    Electronically signed by:  Sohail Garcia PTA

## 2023-02-22 ENCOUNTER — HOSPITAL ENCOUNTER (OUTPATIENT)
Dept: PHYSICAL THERAPY | Age: 58
Setting detail: THERAPIES SERIES
Discharge: HOME | End: 2023-02-22
Payer: COMMERCIAL

## 2023-02-22 PROCEDURE — 97140 MANUAL THERAPY 1/> REGIONS: CPT

## 2023-02-22 PROCEDURE — 97110 THERAPEUTIC EXERCISES: CPT

## 2023-02-22 PROCEDURE — 97016 VASOPNEUMATIC DEVICE THERAPY: CPT

## 2023-02-22 NOTE — FLOWSHEET NOTE
[x] [facility]   Outpatient Rehabilitation & Therapy  [address]  P: [phone]   F: [phone]    Physical Therapy Daily Treatment Note    Date:  2023  Patient Name:  Colton Dickinson    :  1965  MRN: [id number]  Physician: Minnie Thompson MD                          Insurance: [facility] -- Generic Self-insured -- 3x/wk for 3 weeks -3/9  Medical Diagnosis:   S46.111A (ICD-10-CM) - Strain of muscle, fascia and tendon of long head of biceps, right arm, initial encounter   S46.011A (ICD-10-CM) - Strain of muscle(s) and tendon(s) of the rotator cuff of right shoulder, initial encounter                 Onset Date: 23                 Next 's appt: 23 Occupational health     Visit# / total visits:   Cancels/No Shows: 0/0    Subjective: Patient reporting that his pain continues to stay low but does have difficulty with shoulder ABD.  Patient reports he worked last night and spent his job looking up at a computer monitor causing increased pain in his neck.    Pain:  [x] Yes  [] No Location: R shoulder  Pain Rating: (0-10 scale) 1-2/10  Pain altered Tx:  [] No  [] Yes  Action:    Objective:  Precautions: none   INTERVENTIONS  Reps/ Time Weight/ Level Completed  Today Comments   MODALITIES       Vaso-compression _ R shoulder  15 min  34 deg, mod  x Sitting                  UBE 2'/2'  x Fwd/back   Pulleys flex/scap/abd  20x ea   x    PROM 8'  x    Wall walks flex/abd/scap  5x5\" hold   x Flex 24 scap 21- burning ABD   Upper trap stretch 2x 30\"  x  added   Levator stretch 2x30\"  x  added   3 way bicep curl 10x 4# x  added                 Seated AROM  10x  x Flexion, scaption, ABD  added   Scap retraction with towel  10x5\"  2 sets  x    B ER with towel  10x5\"  2 sets       Scap shapes on wall  20x  ea   Cw/ccw/up/down/side-side use of pillow case    Prone I, Y, T   Add                                Manual       Manual  10'  x  light distraction, A/P, inferior  mob anterior shoulder joint    MFR  5'   Bicep, subscap     Measurements  Position 2/22/23    Flexion 145  pain        Scaption 140    ER 90        Specific Instructions for next treatment: ROM, scap activation, pain free range strength  -     Assessment:   2/22: Began session with UBE as warm up and to gather subjective info. Patient demonstrates difficulty with shoulder ABD. Added 3 way bicep curls this session with patient reporting fatigue at the end of exercise. Added AROM in standing for flexion, ABD and scaption to tolerance. Followed exercises with manual to decrease tightness and discomfort and measurements are recorded above. Patient demonstrates good ER but continues to exhibit pain or discomfort at the end ranges of flexion, ABD and scaption with OP. Completed session with vaso for pain management. [x] Progressing toward goals. [] No change. [] Other:    [] Patient would continue to benefit from skilled physical therapy services in order to: modulate pain, improve strength, and improve/maintain ROM to better tolerate ADL and work activities. Will continue to assess and monitor how pt is progressing. STG/LTG: (to be met in 9 treatments)  Pt will demonstrate improved R UE strength to 5/5 or greater in order to demonstrate improved stability/strength necessary for unrestricted ADLs/work activities  Pt will self report worst pain no greater than 3/10 with overhead reaching in order to better tolerate ADLs/work activities with minimal dysfunction  Pt will be able to flexion and abduct R shoulder to > 130 deg actively to demonstrate the range needed to lift overhead which is a necessary work duty. Pt will decrease functional limitation per SPADI to <25% in order to demonstrate improved functional tolerances at PLOF with minimal restriction/dysfunction  Pt will demonstrate independence with a long term HEP for continued progress/maintenance after completion of PT    Pt.  Education: [] Yes  [] No  [] Reviewed Prior HEP/Ed  Method of Education: [] Verbal  [] Demo  [] Written  Comprehension of Education:  [] Verbalizes understanding. [] Demonstrates understanding. [] Needs review. [] Demonstrates/verbalizes HEP/Ed previously given. EXERCISES - HEP 2/15/23       Supine AAROM flexion  10x   daily   Supine AAROM ABD  10x  daily   Supine AAROM ER At 60 deg ABD  10x  daily   Serratus punches  10x   daily         Plan: [x] Continue per plan of care.    [] Other:       Treatment Charges: Mins Units Time In/Out   [] Evaluation       []  Low       []  Moderate       []  High      []  Modalities        [x]  Ther Exercise 30 2 3498-2557   []  Neuromuscular Re-ed      []  Gait Training      [x]  Manual Therapy 10 1 6642-4028   []  Ther Activities      []  Aquatics      [x]  Vasocompression 15 1 8224-5689   []  Cervical Traction      []  Other      Total Treatment time 55 4     vaso set up time not accounted for    Time IN:0206          Time Out: 5644    Electronically signed by:  Cindy Cevallos PTA

## 2023-02-22 NOTE — CARE COORDINATION
[x] [facility] ([facility]) - [facility] & Therapy  [address]  [place]: [phone]   F: [phone]     Physical Therapy      Date:  23  Patient Name:  Anyi Donato                   :  1965                   MRN: [id number]  Physician: Sarah Lorenzana MD                          Insurance: [facility] -- Generic Self-insured -- 3x/wk for 3 weeks -3/9  Medical Diagnosis:   S46.111A (ICD-10-CM) - Strain of muscle, fascia and tendon of long head of biceps, right arm, initial encounter   25 [id number] (ICD-10-CM) - Strain of muscle(s) and tendon(s) of the rotator cuff of right shoulder, initial encounter                 Onset Date: 23                 Next 's appt: 23 Occupational health     Evaluation and treatment notes sent to Employer RN, Allie De Leon @ [employer] per request as of today. She requests notes updating case weekly.   Phone # [phone]  Fax # [phone]    Charu Fuller PTA

## 2023-02-23 ENCOUNTER — HOSPITAL ENCOUNTER (OUTPATIENT)
Dept: PHYSICAL THERAPY | Age: 58
Setting detail: THERAPIES SERIES
Discharge: HOME | End: 2023-02-23
Payer: COMMERCIAL

## 2023-02-23 PROCEDURE — 97110 THERAPEUTIC EXERCISES: CPT

## 2023-02-23 PROCEDURE — 97016 VASOPNEUMATIC DEVICE THERAPY: CPT

## 2023-02-23 PROCEDURE — 97140 MANUAL THERAPY 1/> REGIONS: CPT

## 2023-02-23 NOTE — FLOWSHEET NOTE
[x] [facility] ([facility]) - [facility] & Therapy  [address]  [place]: [phone]   F: [phone]    Physical Therapy Daily Treatment Note    Date:  2023  Patient Name:  Camilo Julien    :  1965  MRN: [id number]  Physician: Reyes Hopper MD                          Insurance: [facility] -- Generic Self-insured -- 3x/wk for 3 weeks -3/9  Medical Diagnosis:   S46.111A (ICD-10-CM) - Strain of muscle, fascia and tendon of long head of biceps, right arm, initial encounter   25 [id number] (ICD-10-CM) - Strain of muscle(s) and tendon(s) of the rotator cuff of right shoulder, initial encounter                 Onset Date: 23                 Next 's appt: 3/7/23 Occupational health     Visit# / total visits:   Cancels/No Shows: 0/0    Subjective: Saw Dr Larkin- to continue with PT - has another follow up 3/7/23. Occasional sharp shooting pain with quick movements or positioning. Able to sleep on R side some. Reports he is still avoiding pushing/pulling and climbing at work. Overall feeling better since injury first happened but notices restrictions with dressing or reaching overhead and especially with lowering from an elevated position.  Notes continued neck stiffness    Pain:  [x] Yes  [] No Location: R shoulder  Pain Rating: (0-10 scale) 0/10 @ rest; 2-3/10 at worst  Pain altered Tx:  [x] No  [] Yes  Action:    Work Status: Shital - still working full time-- not doing lifting, pushing, pulling or climbing -- keeping most things in front of him      Objective:  Precautions: none   INTERVENTIONS  Reps/ Time Weight/ Level Completed  Today Comments   MODALITIES       Vaso-compression _ R shoulder  15 min  34 deg, mod  x Sitting                  UBE 2'/2' 1.0 x Fwd/back   Pulleys flex/scap/abd  20x ea   X    PROM 8'      Wall walks flex/abd/scap  5x5\" hold    HEP issued 23   Upper trap stretch 2x 30\"  x HEP issued 23   Levator stretch 2x30\"  x HEP issued 23   3 way bicep curl 15x 4# X Most difficulty with palm down                 Seated AROM  10x   Flexion, scaption, ABD 2/22 added   Scap retraction with towel  10x5\"  2 sets   HEP issued 2/23/23   B ER with towel  10x5\"  2 sets    HEP issued 2/23/23   Ball Circles  cw/ccw at [place] 90* Flex 30\" each 2 MediBall  +pain with eccentric lowering    Prone I Palm Up (R) 10x  X Try with towel roll at forehead next visit due to neck pain   Prone T Palm Down (R) 10x  X Limited range due to weakness> pain   Prone Y       T Tube Rows 10x Green X    T-Tube Extension 10x Green X    T tube (R) IR/ER @ Neutral 10x Red X Difficulty with ER but denies increased pain          Manual       Manual (R) GH Posterior glides and Inferior glides Grade I/II; Distraction 8'  X Tenderness at anterior shoulder with posterior glide   MFR  5'   Bicep, subscap     Measurements for R shoulder:   Position 2/22/23 2/23/23   Flexion 145  pain 154* AA on Pulleys    147* AA on Pulleys   Scaption 140 142* AA on Pulleys   ER 90    IR @ 60* ABD Supine  WNL   ER @ 60* ABD Supine  76*       Specific Instructions for next treatment: Assess response to progressions and continue to progress with ROM and scapular stability as tolerated; progress gentle RTC strength a symptoms allow    Assessment:   [x] Progressing toward goals. Completed program to tolerance as noted above with emphasis on R shoulder mobility and general dorsal stabilization. Added in T band scapular strengthening along with IR/ER gentle strengthening as tolerated; patient most limited with ER @ neutral due to weakness and discomfort. Continued patient education on improved postural awareness and decreasing activation of right upper trap to avoid compensation/increased neck pain. Patient encouraged to avoid painful arcs and avoid pushing through pain during activity. Patient could not tolerate 90* ABD with IR/ER this date due to pain at right anterior shoulder.  Moderate to severe tenderness at R anterior shoulder with posterior glide mobs. Improved mobility noted overall however continues to present with pain during eccentric lowering - especially with outstretched arm. Added prone scpaular stabilization with shoulder Ext and horiz Abd- weakness noted in middle trap and rhomboids- unable to tolerate Y position this date due to pain and weak lower trap. Finished with vaso cold compression to assist with pain and swelling     [] No change. [] Other:    [] Patient would continue to benefit from skilled physical therapy services in order to: modulate pain, improve strength, and improve/maintain ROM to better tolerate ADL and work activities. Will continue to assess and monitor how pt is progressing. STG/LTG: (to be met in 9 treatments)  Pt will demonstrate improved R UE strength to 5/5 or greater in order to demonstrate improved stability/strength necessary for unrestricted ADLs/work activities  Pt will self report worst pain no greater than 3/10 with overhead reaching in order to better tolerate ADLs/work activities with minimal dysfunction  Pt will be able to flexion and abduct R shoulder to > 130 deg actively to demonstrate the range needed to lift overhead which is a necessary work duty. Pt will decrease functional limitation per SPADI to <25% in order to demonstrate improved functional tolerances at PLOF with minimal restriction/dysfunction  Pt will demonstrate independence with a long term HEP for continued progress/maintenance after completion of PT    Patient Goals/Rehab Expectations: to improve pain and mobility    Pt. Education:  [x] Yes  [] No  [x] Reviewed Prior HEP/Ed  Method of Education: [x] Verbal  [x] Demo  [x] Written- Copy in chart of exercise listed above; patient also instructed to continue activity issued 2/15/23   Comprehension of Education:  [x] Verbalizes understanding. [x] Demonstrates understanding. [] Needs review.   [x] Demonstrates/verbalizes HEP/Ed previously given.    EXERCISES - HEP 2/15/23       Supine AAROM flexion  10x   daily   Supine AAROM ABD  10x  daily   Supine AAROM ER At 60 deg ABD  10x  daily   Serratus punches  10x   daily         Plan: [x] Continue per plan of care.    [] Other:       Treatment Charges: Mins Units Time In/Out   [] Evaluation       []  Low       []  Moderate       []  High      []  Modalities        [x]  Ther Exercise 30 2 332 PM-350 PM; 400 PM-412 PM   []  Neuromuscular Re-ed      []  Gait Training      [x]  Manual Therapy 8 1 351 PM-359 PM   []  Ther Activities      []  Aquatics      [x]  Vasocompression 15 1 516 QC-831 PM   []  Cervical Traction      []  Other      Total Treatment time 53 4          Time In: 067 PM         Time Out: 171 PM    Electronically signed by:  Bozena Wagner PTA

## 2023-02-27 ENCOUNTER — HOSPITAL ENCOUNTER (OUTPATIENT)
Dept: PHYSICAL THERAPY | Age: 58
Setting detail: THERAPIES SERIES
Discharge: HOME | End: 2023-02-27
Payer: COMMERCIAL

## 2023-02-27 PROCEDURE — 97140 MANUAL THERAPY 1/> REGIONS: CPT

## 2023-02-27 PROCEDURE — 97110 THERAPEUTIC EXERCISES: CPT

## 2023-02-27 PROCEDURE — 97016 VASOPNEUMATIC DEVICE THERAPY: CPT

## 2023-02-27 NOTE — FLOWSHEET NOTE
[x] [facility]) - [facility] & Therapy  [address]  [place]: [phone]   F: [phone]    Physical Therapy Daily Treatment Note    Date:  2023  Patient Name:  Emili Santos    :  1965  MRN: [id number]  Physician: Festus Braga MD                          Insurance: [employer] -- Generic Self-insured -- 3x/wk for 3 weeks -3/9  Medical Diagnosis:   S46.111A (ICD-10-CM) - Strain of muscle, fascia and tendon of long head of biceps, right arm, initial encounter   25 [id number] (ICD-10-CM) - Strain of muscle(s) and tendon(s) of the rotator cuff of right shoulder, initial encounter                 Onset Date: 23                 Next 's appt: 3/7/23 Occupational health     Visit# / total visits:   Cancels/No Shows: 0/0    Subjective:  States still having pain with certain movements- like putting on a coat. States pain still increases with increased activity/work. States trying to stay active at home at work within restrictions.       Pain:  [x] Yes  [] No Location: R shoulder  Pain Rating: (0-10 scale) 0/10 @ rest; 2-4/10 at worst  Pain altered Tx:  [x] No  [] Yes  Action:    Work Status: Shital - still working full time-- not doing lifting, pushing, pulling or climbing -- keeping most things in front of him      Objective:  Precautions: none   INTERVENTIONS  Reps/ Time Weight/ Level Completed  Today Comments   MODALITIES       Vaso-compression _ R shoulder  15 min  34 deg, mod  x Sitting                  UBE 2'/2' 1.0 x Fwd/back   Pulleys flex/scap/abd  20x ea   X    PROM 8'      Wall walks flex/abd/scap  5x5\" hold    HEP issued 23   Upper trap stretch 2x 30\"  x HEP issued 23   Levator stretch 2x30\"  x HEP issued 23   3 way bicep curl 15x 4# X Most difficulty with palm down                 Seated AROM  10x   Flexion, scaption, ABD  added   Scap retraction with towel  10x5\"  2 sets   HEP issued 23   B ER with towel  10x5\"  2 sets    HEP issued 2/23/23   Ball Circles  cw/ccw at [place] [place] 90* Flex 30\" each 2 MediBall  +pain with eccentric lowering    Prone I Palm Up (R) 10x  X Try with towel roll at forehead next visit due to neck pain   Prone T Palm Down (R) 10x  X Limited range due to weakness> pain   Prone Y 10x  x    T Tube Rows 10x Green X    T-Tube Extension 10x Green X    T tube (R) IR/ER @ Neutral 10x Red X Difficulty with ER but denies increased pain          Manual       Manual (R) GH Posterior glides and Inferior glides Grade I/II; Distraction 8'  X Tenderness at anterior shoulder with posterior glide   MFR  5'   Bicep, subscap     Measurements for R shoulder:   Position 2/22/23 2/23/23 2/27/23   Flexion 145  pain 154* AA on Pulleys 150 AA on pulleys    147* AA on Pulleys 148 AA on pulleys   Scaption 140 142* AA on Pulleys 150 AA pulleys   ER 90     IR @ 60* ABD Supine  WNL WNL   ER @ 60* ABD Supine  76* 85*       Specific Instructions for next treatment: Assess response to progressions and continue to progress with ROM and scapular stability as tolerated; progress gentle RTC strength a symptoms allow    Assessment:   [x] Progressing toward goals. Continued with emphasis on pain free range of motion and scapular strengthening. Good AAROM with most exercises. Less tenderness overall throughout shoulder and good tolerance to PROM and manual.  Limited tolerance to prone due to neck tightness but able to complete exercises listed above. Added Prone Y for lower trap strengthening. Minimal ROM to remain in pain free ranges. Ended with vasocompression for pain relief to decrease soreness. [] No change. [] Other:    [x] Patient would continue to benefit from skilled physical therapy services in order to: modulate pain, improve strength, and improve/maintain ROM to better tolerate ADL and work activities. Will continue to assess and monitor how pt is progressing.       STG/LTG: (to be met in 9 treatments)  Pt will demonstrate improved R UE strength to 5/5 or greater in order to demonstrate improved stability/strength necessary for unrestricted ADLs/work activities  Pt will self report worst pain no greater than 3/10 with overhead reaching in order to better tolerate ADLs/work activities with minimal dysfunction  Pt will be able to flexion and abduct R shoulder to > 130 deg actively to demonstrate the range needed to lift overhead which is a necessary work duty. Pt will decrease functional limitation per SPADI to <25% in order to demonstrate improved functional tolerances at PLOF with minimal restriction/dysfunction  Pt will demonstrate independence with a long term HEP for continued progress/maintenance after completion of PT    Patient Goals/Rehab Expectations: to improve pain and mobility    Pt. Education:  [x] Yes  [] No  [x] Reviewed Prior HEP/Ed  Method of Education: [x] Verbal  [x] Demo  [x] Written- Copy in chart of exercise listed above; patient also instructed to continue activity issued 2/15/23   Comprehension of Education:  [x] Verbalizes understanding. [x] Demonstrates understanding. [] Needs review. [x] Demonstrates/verbalizes HEP/Ed previously given. EXERCISES - HEP 2/15/23       Supine AAROM flexion  10x   daily   Supine AAROM ABD  10x  daily   Supine AAROM ER At 60 deg ABD  10x  daily   Serratus punches  10x   daily         Plan: [x] Continue per plan of care.    [] Other:       Treatment Charges: Mins Units Time In/Out   [] Evaluation       []  Low       []  Moderate       []  High      []  Modalities        [x]  Ther Exercise 30 2 332 PM-402 PM;   []  Neuromuscular Re-ed      []  Gait Training      [x]  Manual Therapy 8 1 402 PM- 410 PM   []  Ther Activities      []  Aquatics      [x]  Vasocompression 10 1 410-420 PM   []  Cervical Traction      []  Other      Total Treatment time 48 4          Time In: 078 PM         Time Out: 642 PM    Electronically signed by:  Steve Arnett PTA

## 2023-03-01 ENCOUNTER — HOSPITAL ENCOUNTER (OUTPATIENT)
Dept: PHYSICAL THERAPY | Age: 58
Setting detail: THERAPIES SERIES
Discharge: HOME | End: 2023-03-01
Payer: COMMERCIAL

## 2023-03-01 PROCEDURE — 97110 THERAPEUTIC EXERCISES: CPT

## 2023-03-01 PROCEDURE — 97016 VASOPNEUMATIC DEVICE THERAPY: CPT

## 2023-03-01 PROCEDURE — 97140 MANUAL THERAPY 1/> REGIONS: CPT

## 2023-03-01 NOTE — FLOWSHEET NOTE
[x] [facility]   Outpatient Rehabilitation & Therapy  [address]  P: [phone]   F: [phone]    Physical Therapy Daily Treatment Note    Date:  3/1/2023  Patient Name:  Colton Dickinson    :  1965  MRN: [id number]  Physician: Minnie Thompson MD                          Insurance: [facility] -- Generic Self-insured -- 3x/wk for 3 weeks -3/9  Medical Diagnosis:   S46.111A (ICD-10-CM) - Strain of muscle, fascia and tendon of long head of biceps, right arm, initial encounter   S46.011A (ICD-10-CM) - Strain of muscle(s) and tendon(s) of the rotator cuff of right shoulder, initial encounter                 Onset Date: 23                 Next 's appt: 3/7/23 Occupational health     Visit# / total visits:   Cancels/No Shows: 0/0    Subjective:  3/1: Patient arrived and states things are relatively the same. Continues to have constant pains and sometimes pain in shoulder gets so bad that it can cause neck pain/spasms.     Pain:  [x] Yes  [] No Location: R shoulder  Pain Rating: (0-10 scale) 0/10 @ rest; 3-4/10 currently   Pain altered Tx:  [x] No  [] Yes  Action:    Work Status: Sh[employer] - still working full time-- not doing lifting, pushing, pulling or climbing -- keeping most things in front of him      Objective:  Precautions: none   INTERVENTIONS  Reps/ Time Weight/ Level Completed  Today Comments   MODALITIES       Vaso-compression _ R shoulder  15 min  34 deg, mod  x Sitting                  UBE 2'/2' 1.0  Fwd/back   Pulleys flex/scap/abd  20x ea   X    PROM 8'      Wall walks flex/abd/scap  5x5\" hold    HEP issued 23   Upper trap stretch 2x 30\"   HEP issued 23   Levator stretch 2x30\"   HEP issued 23   3 way bicep curl 20x 4# X Most difficulty with palm down                 Seated AROM  10x   Flexion, scaption, ABD  added   Scap retraction with towel  10x5\"  2 sets   HEP issued 23   B ER with towel  10x5\"  2 sets    HEP issued 23   Ball Circles   cw/ccw at Wall 90* Flex 30\" each 2 MediBall  +pain with eccentric lowering    Prone I Palm Up (R) 15x  X 3/1 inc reps  Patient prefers to be in head holds at end of table for neck pain   Prone T Palm Down (R) 15x  X 3/1 inc reps  Limited range due to weakness> pain   Prone Y 15x  x 3/1 inc reps   Prone retraction  15x  x 3/1 added    T Tube Rows 15x Green X 3/1 inc reps   T-Tube Extension 15x Green X 3/1 inc reps   T tube (R) IR/ER @ Neutral 15x Red X 3/1 inc reps  Difficulty with ER but denies increased pain          Manual       Manual (R) GH Posterior glides and Inferior glides Grade I/II; Distraction 8'  X Tenderness at anterior shoulder with posterior glide   MFR  5'   Bicep, subscap     Specific Instructions for next treatment: Assess response to progressions and continue to progress with ROM and scapular stability as tolerated; progress gentle RTC strength a symptoms allow    Assessment:   [x] Progressing toward goals. 3/1: Patient completed all exercise to tolerance with increase in repetitions during prone exercises,and theraband rows/extension. Patient noted fluctuation of neck pain with exercise most increased pain occurred with standing ER this date. Patient continues to have discomfort in anterior shoulder region especially with posterior glides. Continued with vaso compression for pain and edema control. [] No change. [] Other:    [x] Patient would continue to benefit from skilled physical therapy services in order to: modulate pain, improve strength, and improve/maintain ROM to better tolerate ADL and work activities. Will continue to assess and monitor how pt is progressing.       STG/LTG: (to be met in 9 treatments)  Pt will demonstrate improved R UE strength to 5/5 or greater in order to demonstrate improved stability/strength necessary for unrestricted ADLs/work activities  Pt will self report worst pain no greater than 3/10 with overhead reaching in order to better tolerate ADLs/work activities with minimal dysfunction  Pt will be able to flexion and abduct R shoulder to > 130 deg actively to demonstrate the range needed to lift overhead which is a necessary work duty. Pt will decrease functional limitation per SPADI to <25% in order to demonstrate improved functional tolerances at PLOF with minimal restriction/dysfunction  Pt will demonstrate independence with a long term HEP for continued progress/maintenance after completion of PT    Patient Goals/Rehab Expectations: to improve pain and mobility    Pt. Education:  [x] Yes  [] No  [x] Reviewed Prior HEP/Ed  Method of Education: [x] Verbal  [x] Demo  [x] Written- Copy in chart of exercise listed above; patient also instructed to continue activity issued 2/15/23   Comprehension of Education:  [x] Verbalizes understanding. [x] Demonstrates understanding. [] Needs review. [x] Demonstrates/verbalizes HEP/Ed previously given. EXERCISES - HEP 2/15/23       Supine AAROM flexion  10x   daily   Supine AAROM ABD  10x  daily   Supine AAROM ER At 60 deg ABD  10x  daily   Serratus punches  10x   daily         Plan: [x] Continue per plan of care.    [] Other:       Treatment Charges: Mins Units Time In/Out   [] Evaluation       []  Low       []  Moderate       []  High      []  Modalities        [x]  Ther Exercise 30 2 338-408 pm    []  Neuromuscular Re-ed      []  Gait Training      [x]  Manual Therapy 8 1 330-338 pm    []  Ther Activities      []  Aquatics      [x]  Vasocompression 15 1 408-423 pm    []  Cervical Traction      []  Other      Total Treatment time 53 4 330-423 pm          Time In: 330 PM         Time Out: 944 PM    Electronically signed by:  Rosa Arriaza PTA

## 2023-03-03 ENCOUNTER — HOSPITAL ENCOUNTER (OUTPATIENT)
Dept: PHYSICAL THERAPY | Age: 58
Setting detail: THERAPIES SERIES
Discharge: HOME | End: 2023-03-03
Payer: COMMERCIAL

## 2023-03-03 PROCEDURE — 97016 VASOPNEUMATIC DEVICE THERAPY: CPT

## 2023-03-03 PROCEDURE — 97140 MANUAL THERAPY 1/> REGIONS: CPT

## 2023-03-03 PROCEDURE — 97110 THERAPEUTIC EXERCISES: CPT

## 2023-03-03 NOTE — FLOWSHEET NOTE
[x] [facility]) - [facility] & Therapy  [address]  [place]: [phone]   F: [phone]    Physical Therapy Daily Treatment Note    Date:  3/3/2023  Patient Name:  Chasity Preciado    :  1965  MRN: [id number]  Physician: Neil Alberto MD                          Insurance: [facility] -- Generic Self-insured -- 3x/wk for 3 weeks -3/9  Medical Diagnosis:   S46.111A (ICD-10-CM) - Strain of muscle, fascia and tendon of long head of biceps, right arm, initial encounter   [id number] (ICD-10-CM) - Strain of muscle(s) and tendon(s) of the rotator cuff of right shoulder, initial encounter                 Onset Date: 23                 Next 's appt: 3/7/23 Occupational health     Visit# / total visits:   Cancels/No Shows: 0/0    Subjective:  3/3: Patient reporting to PT with no complaint of pain or discomfort. Patient states he feels good and is mostly compliant with HEP.      Pain:  [] Yes  [x] No Location: R shoulder  Pain Rating: (0-10 scale)  0/10 currently   Pain altered Tx:  [x] No  [] Yes  Action:    Work Status: Shital - still working full time-- not doing lifting, pushing, pulling or climbing -- keeping most things in front of him      Objective:  Precautions: none   INTERVENTIONS  Reps/ Time Weight/ Level Completed  Today Comments   MODALITIES       Vaso-compression _ R shoulder  15 min  34 deg, mod  x Sitting                  UBE 2'/2' 1.5 x Fwd/back   Pulleys flex/scap/abd  20x ea       PROM 8'      Wall walks flex/abd/scap  5x5\" hold    HEP issued 23   Upper trap stretch 2x 30\"   HEP issued 23   Levator stretch 2x30\"   HEP issued 23   3 way bicep curl 20x 4#  Most difficulty with palm down          B ER with scap retraction 2x10  x    IR stretch with pulleys 20x3''  x    Seated AROM  10x  x Flexion, scaption, ABD    Scap retraction with towel  10x5\"  2 sets   HEP issued 23   B ER with towel  10x5\"  2 sets    HEP issued 23   [employer] cw/ccw at Wall 90* Flex 30\" each 2 MediBall x +pain with eccentric lowering    Prone I Palm Up (R) 15x  x 3/1 inc reps  Patient prefers to be in head holds at end of table for neck pain   Prone T Palm Down (R) 15x   3/1 inc reps  Limited range due to weakness> pain   Prone Y 15x  x 3/1 inc reps   Prone retraction  15x  x 3/1 added    T Tube Rows 15x Green x 3/1 inc reps   T-Tube Extension 15x Green x 3/1 inc reps   T tube (R) IR/ER @ Neutral 15x Red  3/1 inc reps  Difficulty with ER but denies increased pain                 Manual       Manual (R) GH Posterior glides and Inferior glides Grade I/II; Distraction 8'   Tenderness at anterior shoulder with posterior glide   MFR  5'   Bicep, subscap   STM L UT, levator 8'  x      Specific Instructions for next treatment: Assess response to progressions and continue to progress with ROM and scapular stability as tolerated; progress gentle RTC strength a symptoms allow    Assessment:   [x] Progressing toward goals. 3/3: Began session with UBE to promote joint mobility prior to ROM and strengthening exercises. Patient noted production of pain with ball on wall exercise, specifically at B posterior shoulder/neck. Incorporated STM to L UT and levator to decrease tension and pain. Patient noted improved symptoms post-manual and vaso application. [] No change. [] Other:    [x] Patient would continue to benefit from skilled physical therapy services in order to: modulate pain, improve strength, and improve/maintain ROM to better tolerate ADL and work activities. Will continue to assess and monitor how pt is progressing.       STG/LTG: (to be met in 9 treatments)  Pt will demonstrate improved R UE strength to 5/5 or greater in order to demonstrate improved stability/strength necessary for unrestricted ADLs/work activities  Pt will self report worst pain no greater than 3/10 with overhead reaching in order to better tolerate ADLs/work activities with minimal dysfunction  Pt will be able to flexion and abduct R shoulder to > 130 deg actively to demonstrate the range needed to lift overhead which is a necessary work duty. Pt will decrease functional limitation per SPADI to <25% in order to demonstrate improved functional tolerances at PLOF with minimal restriction/dysfunction  Pt will demonstrate independence with a long term HEP for continued progress/maintenance after completion of PT    Patient Goals/Rehab Expectations: to improve pain and mobility    Pt. Education:  [x] Yes  [] No  [x] Reviewed Prior HEP/Ed  Method of Education: [x] Verbal  [x] Demo  [x] Written- Copy in chart of exercise listed above; patient also instructed to continue activity issued 2/15/23   Comprehension of Education:  [x] Verbalizes understanding. [x] Demonstrates understanding. [] Needs review. [x] Demonstrates/verbalizes HEP/Ed previously given. EXERCISES - HEP 2/15/23       Supine AAROM flexion  10x   daily   Supine AAROM ABD  10x  daily   Supine AAROM ER At 60 deg ABD  10x  daily   Serratus punches  10x   daily         Plan: [x] Continue per plan of care.    [] Other:       Treatment Charges: Mins Units Time In/Out   [] Evaluation       []  Low       []  Moderate       []  High      []  Modalities        [x]  Ther Exercise 32 2 330-402pm    []  Neuromuscular Re-ed      []  Gait Training      [x]  Manual Therapy 8 1 402-410 pm    []  Ther Activities      []  Aquatics      [x]  Vasocompression 15 1 410-425 pm    []  Cervical Traction      []  Other      Total Treatment time 55 4 330-425 pm          Time In: 330 PM         Time Out: 425 PM    Electronically signed by:  Eliazar Floyd PTA

## 2023-03-06 ENCOUNTER — HOSPITAL ENCOUNTER (OUTPATIENT)
Dept: PHYSICAL THERAPY | Age: 58
Setting detail: THERAPIES SERIES
Discharge: HOME | End: 2023-03-06
Payer: COMMERCIAL

## 2023-03-06 PROCEDURE — 97110 THERAPEUTIC EXERCISES: CPT

## 2023-03-06 PROCEDURE — 97016 VASOPNEUMATIC DEVICE THERAPY: CPT

## 2023-03-06 NOTE — FLOWSHEET NOTE
[x] [facility]) - [facility] & Therapy  [address]  [place]: [phone]   F: [phone]    Physical Therapy Daily Treatment Note    Date:  3/6/2023  Patient Name:  Berna Mcgrath    :  1965  MRN: [id number]  Physician: Linda Chapa MD                          Insurance: [facility] -- Generic Self-insured -- 3x/wk for 3 weeks -3/9  Medical Diagnosis:   S46.111A (ICD-10-CM) - Strain of muscle, fascia and tendon of long head of biceps, right arm, initial encounter   25 [id number] (ICD-10-CM) - Strain of muscle(s) and tendon(s) of the rotator cuff of right shoulder, initial encounter                 Onset Date: 23                 Next 's appt: 3/7/23 Occupational health     Visit# / total visits:   Cancels/No Shows: 0/0    Subjective:  Pt notes his R shoulder is still pain situation dependent. He feels most pain in the humeral head into the bicep tricep region. Described a burning nagging pain. Still having some pain across the back of the neck with activity -- especially days of PT and work. Doing a lot of heat at home. Feeling that the neck is more bothersome than the shoulder. He is to see     Pain:  [x] Yes  [] No   Location: R shoulder -- 1-2/10 currently    Lower cervical: 0/10 but can increase to 7-8/10 during various movements. Pain altered Tx:  [x] No  [] Yes  Action:    Work Status: Shital - still working full time-- not doing lifting, pushing, pulling or climbing -- keeping most things in front of him      Objective:  Precautions: none   INTERVENTIONS  Reps/ Time Weight/ Level Completed  Today Comments   MODALITIES       Vaso-compression _ R shoulder  15 min  34 deg, mod  x Sitting -- added hot pack to cervical spine for comfort.                   UBE 2'/2' 2 x Fwd/back   Pulleys flex/scap/abd  20x ea       PROM 8'      Wall walks flex/abd/scap  5x5\" hold    HEP issued 23   Upper trap stretch 2x 30\"  x Guarded through the R UT  - limited tolerance to over pressure    Levator stretch 2x30\"  x HEP issued 2/23/23   3 way bicep curl 20x 4#  Most difficulty with palm down          B ER with scap retraction 2x10      IR stretch with pulleys 20x3''      Seated AROM  10x   Flexion, scaption, ABD    Scap retraction with towel  10x5\"  2 sets   HEP issued 2/23/23   B ER with towel  10x5\"  2 sets    HEP issued 2/23/23   Ball Circles  cw/ccw at [address] 90* Flex 30\" each 2 MediBall  +pain with eccentric lowering    Prone I Palm Up (R) 15x   3/1 inc reps  Patient prefers to be in head holds at end of table for neck pain   Prone T Palm Down (R) 15x   3/1 inc reps  Limited range due to weakness> pain   Prone Y 15x      Prone retraction  15x      T Tube Rows 15x2 Green x    T-Tube Extension 15x2 Green x    T tube (R) IR/ER @ Neutral 15x2 green x Difficulty with ER but denies increased pain                 Manual       Manual (R) GH Posterior glides and Inferior glides Grade I/II; Distraction 8'   Tenderness at anterior shoulder with posterior glide   MFR  5'   Bicep, subscap   STM L UT, levator 8'          Test & measures 3/6/23  SPADI: 10.5/130 = 8 % functional limitation   sitting  Range of Motion  Left Range of Motion  Right Strength  Left Strength  Right   Shoulder Flexion 175   A: 155 5 5   Shoulder Abduction 180 A:170 5 3+  (In scaption or full ABD)   Shoulder Extension 80 50       Shoulder ER 70 70 5 3 - breaks immediately due to pain    Shoulder IR 60 T10 apley 5 5   Elbow Flexion     5 5   Elbow Extension     5 5   -- lowering from ER increased pain   Special tests:   + ER lag sign, empty can, painful arc       Specific Instructions for next treatment: Assess response to progressions and continue to progress with ROM and scapular stability as tolerated; progress gentle RTC strength a symptoms allow    Assessment:   [x] Progressing toward goals. Today is pt's last visit approved through his claim.  Pt's R shoulder has shown good improvement as pain is less intense and ROM is improved. Pt still having pain with any amount of resistance given in abduction, scaption, and ER pointing to RTC dysfunction. He is more limited by pain in the cervical spine. Is very guarded and has difficulty with relaxing to allow for appropriate mechanics. Tried stretching prior to strengthening but still very guarded and painful. Mostly keeping R shoulder elevated. Very uncomfortable post strengthening. Added heat pack to cervical spine to prevent increase in pain. Pt to see Occupational Health tomorrow and should discuss next steps, and if further PT warranted a new C9 will be needed. Will hold POC open at this time until next steps determined. [] No change. [] Other:    [x] Patient would continue to benefit from skilled physical therapy services in order to: modulate pain, improve strength, and improve/maintain ROM to better tolerate ADL and work activities. Will continue to assess and monitor how pt is progressing.       STG/LTG: (to be met in 9 treatments)  Pt will demonstrate improved R UE strength to 5/5 or greater in order to demonstrate improved stability/strength necessary for unrestricted ADLs/work activities   -3/6: NOT MET -- limited strength shoulder ER & ABD due to pain   Pt will self report worst pain no greater than 3/10 with overhead reaching in order to better tolerate ADLs/work activities with minimal dysfunction   -3/6: MET -- most pain in shoulder 1-2/10 but can occasionally increase  Pt will be able to flexion and abduct R shoulder to > 130 deg actively to demonstrate the range needed to lift overhead which is a necessary work duty.    -3/6: MET   Pt will decrease functional limitation per SPADI to <25% in order to demonstrate improved functional tolerances at PLOF with minimal restriction/dysfunction   -3/6: MET   Pt will demonstrate independence with a long term HEP for continued progress/maintenance after completion of PT   -3/6: MET but goal to be continued if pt returns Patient Goals/Rehab Expectations: to improve pain and mobility    Pt. Education:  [x] Yes  [] No  [x] Reviewed Prior HEP/Ed  Method of Education: [x] Verbal  [x] Demo  [x] Written- Copy in chart of exercise listed above; patient also instructed to continue activity issued 2/15/23   Comprehension of Education:  [x] Verbalizes understanding. [x] Demonstrates understanding. [] Needs review. [x] Demonstrates/verbalizes HEP/Ed previously given. EXERCISES - HEP 2/15/23       Supine AAROM flexion  10x   daily   Supine AAROM ABD  10x  daily   Supine AAROM ER At 60 deg ABD  10x  daily   Serratus punches  10x   daily         Plan: [] Continue per plan of care.    [x] Other: will follow up as based on visit with occupational health and if new c9 is given        Treatment Charges: Mins Units Time In/Out   [] Evaluation       []  Low       []  Moderate       []  High      []  Modalities        [x]  Ther Exercise 34 2 326-4:00   []  Neuromuscular Re-ed      []  Gait Training      []  Manual Therapy      []  Ther Activities      []  Aquatics      [x]  Vasocompression 15 1 099- 0863   []  Cervical Traction      []  Other      Total Treatment time 49 3           Time In: 3:26 PM         Time Out:  4:20  PM    Electronically signed by:  Amor Shepard PT

## 2023-03-17 ENCOUNTER — HOSPITAL ENCOUNTER (OUTPATIENT)
Dept: MRI IMAGING | Facility: CLINIC | Age: 58
Discharge: HOME | End: 2023-03-17
Payer: COMMERCIAL

## 2023-03-17 DIAGNOSIS — S46.011A STRAIN OF TENDON OF RIGHT ROTATOR CUFF, INITIAL ENCOUNTER: ICD-10-CM

## 2023-03-17 DIAGNOSIS — S46.111A RUPTURE LONG HEAD BICEPS TENDON, RIGHT, INITIAL ENCOUNTER: ICD-10-CM

## 2023-03-17 PROCEDURE — 73221 MRI JOINT UPR EXTREM W/O DYE: CPT

## 2023-03-24 ENCOUNTER — TELEPHONE (OUTPATIENT)
Dept: ORTHOPEDIC SURGERY | Age: 58
End: 2023-03-24

## 2023-03-24 NOTE — TELEPHONE ENCOUNTER
Per Dr Cora Munoz, schedule appt with pt. Left voicemail asking pt to return call to set up NP appt.

## 2023-03-24 NOTE — TELEPHONE ENCOUNTER
Received [facility] referral for [facility] right shoulder injury on 3/24/23. Referral in [facility]'s folder awaiting his review.

## 2023-03-28 ENCOUNTER — TELEPHONE (OUTPATIENT)
Dept: ORTHOPEDIC SURGERY | Age: 58
End: 2023-03-28

## 2023-03-28 NOTE — TELEPHONE ENCOUNTER
Spoke with [facility] and provided the office with date and time for patient's appt with Dr. Orozco.

## 2023-04-05 SDOH — HEALTH STABILITY: PHYSICAL HEALTH: ON AVERAGE, HOW MANY MINUTES DO YOU ENGAGE IN EXERCISE AT THIS LEVEL?: PATIENT DECLINED

## 2023-04-05 SDOH — HEALTH STABILITY: PHYSICAL HEALTH
ON AVERAGE, HOW MANY DAYS PER WEEK DO YOU ENGAGE IN MODERATE TO STRENUOUS EXERCISE (LIKE A BRISK WALK)?: PATIENT DECLINED

## 2023-04-06 ENCOUNTER — OFFICE VISIT (OUTPATIENT)
Dept: ORTHOPEDIC SURGERY | Age: 58
End: 2023-04-06

## 2023-04-06 VITALS — BODY MASS INDEX: 31.5 KG/M2 | RESPIRATION RATE: 14 BRPM | WEIGHT: 225 LBS | HEIGHT: 71 IN

## 2023-04-06 DIAGNOSIS — M25.511 RIGHT SHOULDER PAIN, UNSPECIFIED CHRONICITY: ICD-10-CM

## 2023-04-06 DIAGNOSIS — M75.101 TEAR OF RIGHT ROTATOR CUFF, UNSPECIFIED TEAR EXTENT, UNSPECIFIED WHETHER TRAUMATIC: Primary | ICD-10-CM

## 2023-04-06 NOTE — PROGRESS NOTES
tablet 3    lisinopril (PRINIVIL;ZESTRIL) 2.5 MG tablet Take 2.5 mg by mouth Daily with supper       folic acid (FOLVITE) 1 MG tablet Take 1 mg by mouth Daily with supper       aspirin 81 MG chewable tablet Take 1 tablet by mouth daily. (Patient taking differently: Take 81 mg by mouth Daily with supper) 30 tablet 11    nitroGLYCERIN (NITROSTAT) 0.4 MG SL tablet Place 1 tablet under the tongue every 5 minutes as needed for Chest pain. 25 tablet 6    atorvastatin (LIPITOR) 80 MG tablet Take 1 tablet by mouth nightly. (Patient taking differently: Take 80 mg by mouth Daily with supper) 30 tablet 11    ticagrelor (BRILINTA) 90 MG TABS tablet Take 1 tablet by mouth 2 times daily. 60 tablet 11    omega-3 acid ethyl esters (LOVAZA) 1 G capsule Take 2 capsules by mouth 2 times daily. (Patient taking differently: Take 1 g by mouth Daily with supper) 60 capsule 0     No current facility-administered medications for this visit. Allergies  Allergies have been reviewed. Yojana Rodgers is allergic to erythromycin [erythromycin]. Social History  Yojana Rodgers  reports that he quit smoking about 8 years ago. His smoking use included cigarettes. He smoked an average of .25 packs per day. He has never used smokeless tobacco. He reports current alcohol use. He reports that he does not use drugs. Family History  Colton's family history is not on file. Physical Exam:     Resp 14   Ht 5' 11\" (1.803 m)   Wt 225 lb (102.1 kg)   BMI 31.38 kg/m²    Constitutional: Patient is oriented to person, place, and time. Patient appears well-developed and well nourished. Mental Status/psychiatric: Behavior is normal. Thought content normal.  HENT: Negative otherwise noted  Head: Normocephalic and Atraumatic  Nose: Normal  Respiratory/Cardio: Effort normal. No respiratory distress. Shoulder:    Skin: Skin is warm and dry; no swelling or obvious muscular atrophy.    Vasculature: 2+ radial pulses bilaterally  Neuro: Sensation grossly intact to

## 2023-04-17 NOTE — PROGRESS NOTES
noted \"spot\" on liver    Heart Attack Father     High Cholesterol Father     Other Maternal Grandmother         multiple lung nodules    Prostate Cancer Maternal Grandfather     Stroke Maternal Grandfather     Lung Cancer Paternal Grandmother        PHYSICAL EXAM     /84   Pulse 58   Temp 97.5 °F (36.4 °C) (Temporal)   Ht 5' 11\" (1.803 m)   Wt 238 lb 3.2 oz (108 kg)   SpO2 95%   BMI 33.22 kg/m²    Physical Exam  Constitutional:       Appearance: Normal appearance. HENT:      Head: Atraumatic. Cardiovascular:      Rate and Rhythm: Normal rate and regular rhythm. Pulses: Normal pulses. Heart sounds: Normal heart sounds. No murmur heard. No friction rub. No gallop. Pulmonary:      Effort: Pulmonary effort is normal. No respiratory distress. Breath sounds: Normal breath sounds. Neurological:      Mental Status: He is alert. Psychiatric:         Mood and Affect: Mood normal.       ASSESSMENT/PLAN     1. Nontraumatic tear of right supraspinatus tendon  Patient has upcoming shoulder surgery on 5/2 with Dr. Humza Gtz. It is unclear if he needs clearance from his current worker's comp physician at this time before going forward with his procedure. Advised patient to check with [employer] first before scheduling appointment here for potential medical clearance. Return in about 6 weeks (around 6/1/2023) for f/u .     COMMUNICATION:       Electronically signed by Lesa Cormier MD on 4/20/2023 at 12:07 PM

## 2023-04-18 ENCOUNTER — HOSPITAL ENCOUNTER (OUTPATIENT)
Dept: PREADMISSION TESTING | Age: 58
Discharge: HOME | End: 2023-04-22

## 2023-04-18 ENCOUNTER — HOSPITAL ENCOUNTER (OUTPATIENT)
Age: 58
Setting detail: SPECIMEN
Discharge: HOME | End: 2023-04-18

## 2023-04-18 VITALS
BODY MASS INDEX: 33.6 KG/M2 | WEIGHT: 240 LBS | SYSTOLIC BLOOD PRESSURE: 127 MMHG | OXYGEN SATURATION: 96 % | DIASTOLIC BLOOD PRESSURE: 89 MMHG | HEART RATE: 54 BPM | HEIGHT: 71 IN | RESPIRATION RATE: 16 BRPM

## 2023-04-18 DIAGNOSIS — M75.101 TEAR OF RIGHT ROTATOR CUFF, UNSPECIFIED TEAR EXTENT, UNSPECIFIED WHETHER TRAUMATIC: ICD-10-CM

## 2023-04-18 LAB
ANION GAP SERPL CALCULATED.3IONS-SCNC: 9 MMOL/L (ref 9–17)
BUN SERPL-MCNC: 15 MG/DL (ref 6–20)
CALCIUM SERPL-MCNC: 9.7 MG/DL (ref 8.6–10.4)
CHLORIDE SERPL-SCNC: 103 MMOL/L (ref 98–107)
CO2 SERPL-SCNC: 28 MMOL/L (ref 20–31)
CREAT SERPL-MCNC: 0.95 MG/DL (ref 0.7–1.2)
GFR SERPL CREATININE-BSD FRML MDRD: >60 ML/MIN/1.73M2
GLUCOSE SERPL-MCNC: 96 MG/DL (ref 70–99)
HCT VFR BLD AUTO: 49.1 % (ref 40.7–50.3)
HGB BLD-MCNC: 16.1 G/DL (ref 13–17)
MCH RBC QN AUTO: 29.6 PG (ref 25.2–33.5)
MCHC RBC AUTO-ENTMCNC: 32.8 G/DL (ref 28.4–34.8)
MCV RBC AUTO: 90.3 FL (ref 82.6–102.9)
NRBC AUTOMATED: 0 PER 100 WBC
PDW BLD-RTO: 12.5 % (ref 11.8–14.4)
PLATELET # BLD AUTO: 208 K/UL (ref 138–453)
PMV BLD AUTO: 10.4 FL (ref 8.1–13.5)
POTASSIUM SERPL-SCNC: 4.6 MMOL/L (ref 3.7–5.3)
RBC # BLD: 5.44 M/UL (ref 4.21–5.77)
SODIUM SERPL-SCNC: 140 MMOL/L (ref 135–144)
WBC # BLD AUTO: 6.1 K/UL (ref 3.5–11.3)

## 2023-04-18 ASSESSMENT — PAIN DESCRIPTION - PAIN TYPE: TYPE: ACUTE PAIN

## 2023-04-18 ASSESSMENT — PAIN SCALES - GENERAL: PAINLEVEL_OUTOF10: 2

## 2023-04-18 ASSESSMENT — PAIN DESCRIPTION - ONSET: ONSET: AWAKENED FROM SLEEP

## 2023-04-18 ASSESSMENT — PAIN DESCRIPTION - LOCATION: LOCATION: SHOULDER

## 2023-04-18 ASSESSMENT — PAIN DESCRIPTION - FREQUENCY: FREQUENCY: INTERMITTENT

## 2023-04-18 ASSESSMENT — PAIN - FUNCTIONAL ASSESSMENT: PAIN_FUNCTIONAL_ASSESSMENT: PREVENTS OR INTERFERES WITH MANY ACTIVE NOT PASSIVE ACTIVITIES

## 2023-04-18 ASSESSMENT — PAIN DESCRIPTION - ORIENTATION: ORIENTATION: RIGHT

## 2023-04-18 NOTE — DISCHARGE INSTRUCTIONS
Preoperative Instructions:    Stop eating solid foods at midnight the night prior to your surgery. Stop drinking clear liquids at midnight the night prior to your surgery. Arrive at the surgery center (3rd entrance) on ___2-9-50____________ by __1030am_____________. Please stop any blood thinning medications as directed by your surgeon or prescribing physician. Failure to stop certain medications may interfere with your scheduled surgery. These may include: Aspirin, Coumadin, Plavix, NSAIDS (Motrin, Aleve, Advil, Mobic, Celebrex), Eliquis, Pradaxa, Xarelto, Fish oil, and herbal supplements. You may continue the rest of your medications through the night before surgery unless instructed otherwise. Day of surgery please take only the following medication(s) with a small sips of water: Metoprolol, Lisinopril      Please  shower with provided CHG soap the night before and the morning of surgery the day of surgery. Reminders:  -If you are going home the day of your procedure, you will need a family member or friend to stay during the procedure and drive you home after your procedure. Your  must be 25years of age or older and able to sign off on your discharge instructions.    -If you are going home the same day of your surgery, someone must remain with you for the first 24 hours after your surgery if you receive sedation or anesthesia.      -Please do not wear any jewelery ,  lotions or body piercing the day of surgery

## 2023-04-19 LAB
EKG ATRIAL RATE: 47 BPM
EKG P AXIS: 13 DEGREES
EKG P-R INTERVAL: 166 MS
EKG Q-T INTERVAL: 474 MS
EKG QRS DURATION: 92 MS
EKG QTC CALCULATION (BAZETT): 419 MS
EKG R AXIS: 14 DEGREES
EKG T AXIS: 16 DEGREES
EKG VENTRICULAR RATE: 47 BPM

## 2023-04-20 ENCOUNTER — OFFICE VISIT (OUTPATIENT)
Dept: FAMILY MEDICINE CLINIC | Age: 58
End: 2023-04-20
Payer: COMMERCIAL

## 2023-04-20 VITALS
HEIGHT: 71 IN | HEART RATE: 58 BPM | BODY MASS INDEX: 33.35 KG/M2 | SYSTOLIC BLOOD PRESSURE: 132 MMHG | OXYGEN SATURATION: 95 % | WEIGHT: 238.2 LBS | DIASTOLIC BLOOD PRESSURE: 84 MMHG | TEMPERATURE: 97.5 F

## 2023-04-20 DIAGNOSIS — M75.101 NONTRAUMATIC TEAR OF RIGHT SUPRASPINATUS TENDON: Primary | ICD-10-CM

## 2023-04-20 PROCEDURE — 99203 OFFICE O/P NEW LOW 30 MIN: CPT | Performed by: STUDENT IN AN ORGANIZED HEALTH CARE EDUCATION/TRAINING PROGRAM

## 2023-04-20 PROCEDURE — 3075F SYST BP GE 130 - 139MM HG: CPT | Performed by: STUDENT IN AN ORGANIZED HEALTH CARE EDUCATION/TRAINING PROGRAM

## 2023-04-20 PROCEDURE — 3079F DIAST BP 80-89 MM HG: CPT | Performed by: STUDENT IN AN ORGANIZED HEALTH CARE EDUCATION/TRAINING PROGRAM

## 2023-04-20 RX ORDER — LISINOPRIL 20 MG/1
1 TABLET ORAL DAILY
COMMUNITY
Start: 2023-01-25

## 2023-04-20 SDOH — ECONOMIC STABILITY: FOOD INSECURITY: WITHIN THE PAST 12 MONTHS, THE FOOD YOU BOUGHT JUST DIDN'T LAST AND YOU DIDN'T HAVE MONEY TO GET MORE.: NEVER TRUE

## 2023-04-20 SDOH — ECONOMIC STABILITY: HOUSING INSECURITY
IN THE LAST 12 MONTHS, WAS THERE A TIME WHEN YOU DID NOT HAVE A STEADY PLACE TO SLEEP OR SLEPT IN A SHELTER (INCLUDING NOW)?: NO

## 2023-04-20 SDOH — ECONOMIC STABILITY: INCOME INSECURITY: HOW HARD IS IT FOR YOU TO PAY FOR THE VERY BASICS LIKE FOOD, HOUSING, MEDICAL CARE, AND HEATING?: NOT HARD AT ALL

## 2023-04-20 SDOH — ECONOMIC STABILITY: FOOD INSECURITY: WITHIN THE PAST 12 MONTHS, YOU WORRIED THAT YOUR FOOD WOULD RUN OUT BEFORE YOU GOT MONEY TO BUY MORE.: NEVER TRUE

## 2023-04-20 ASSESSMENT — ENCOUNTER SYMPTOMS
CHEST TIGHTNESS: 0
SORE THROAT: 0
SHORTNESS OF BREATH: 0
ABDOMINAL PAIN: 0
ABDOMINAL DISTENTION: 0

## 2023-04-20 ASSESSMENT — PATIENT HEALTH QUESTIONNAIRE - PHQ9
SUM OF ALL RESPONSES TO PHQ9 QUESTIONS 1 & 2: 0
SUM OF ALL RESPONSES TO PHQ QUESTIONS 1-9: 0
2. FEELING DOWN, DEPRESSED OR HOPELESS: 0
1. LITTLE INTEREST OR PLEASURE IN DOING THINGS: 0

## 2023-04-24 NOTE — PROGRESS NOTES
CC: Pre-Op Consultation    Kaylee Solitario is a year old M/F who is here for pre-op evaluation for arthroscopic rotator cuff repair surgery. Surgeon: Dr. José Miguel Benavides  Date of surgery: 5/2/23    Risk Stratification:    Revised cardiac risk index  - High Risk Surgery (1pt):  - History of ischemic heart disease (1pt):   - History of congestive heart failure (1pt):   - History of cerebrovascular disease (1pt):   - Pre-operative DM with insulin use (1pt):  - Pre-operative creatnine >2mg/dl (1pt):    Risk for cardiac death, nonfatal myocardial infarction, and nonfatal cardiac arrest:  0 predictors = 0.4%, 1 predictor = 0.9%, 2 predictors = 6.6%, ?3 predictors = >11%     PAD risk assessment Any discomfort, aching, or fatigue in either leg when walking: No  Does the discomfort disappear within 10 minutes after you stop walking: No   Symptoms of active cardiac conditions  No unstable or severe angina ([place] Cardiovascular Society Grade III-IV)   No myocardial infarction within the last month   No dyspnea on exertion, orthopnea, or peripheral edema   No tachypalpitations   No exertional presyncope    Sleep apnea risk  No   Prior cardiopulmonary testing    EKG: WNL   METS >4: Yes  Take care of yourself by eating, dressing, bathing,toileting? (2.75 METS)   Walk indoors such as around the house? (1.75 METS)   Walk a block on level ground? (2.75 METS)   Climb a flight of stairs or walk up hill? (5.50 METS) Run a short distance? (8.00 METS)   Do light housework such as dusting or washing dishes? (2.70 METS)   Do moderate housework: vacuuming, sweeping, or carrying groceries?  (3.50 METS)   Do heavy housework such as scrubbing floors or moving furniture? (8.0 METS)   Do yard work such as raking, weeding, or pushing a mower? (4.5 METS) Have sexual relations? (5.25 METS)   Moderate Exercise such as golf, bowl, dance, doubles tennis, throw ball? (6 METS)   Strenuous Exercise such as swim, ski, singles tennis, football, basketball? (7.5

## 2023-04-25 ENCOUNTER — OFFICE VISIT (OUTPATIENT)
Dept: FAMILY MEDICINE CLINIC | Age: 58
End: 2023-04-25
Payer: COMMERCIAL

## 2023-04-25 VITALS
DIASTOLIC BLOOD PRESSURE: 86 MMHG | TEMPERATURE: 97.2 F | HEART RATE: 59 BPM | WEIGHT: 238 LBS | SYSTOLIC BLOOD PRESSURE: 134 MMHG | OXYGEN SATURATION: 95 % | HEIGHT: 71 IN | BODY MASS INDEX: 33.32 KG/M2

## 2023-04-25 DIAGNOSIS — M75.101 NONTRAUMATIC TEAR OF RIGHT SUPRASPINATUS TENDON: ICD-10-CM

## 2023-04-25 DIAGNOSIS — Z01.818 PRE-OP EXAMINATION: Primary | ICD-10-CM

## 2023-04-25 PROCEDURE — 3079F DIAST BP 80-89 MM HG: CPT | Performed by: STUDENT IN AN ORGANIZED HEALTH CARE EDUCATION/TRAINING PROGRAM

## 2023-04-25 PROCEDURE — 3075F SYST BP GE 130 - 139MM HG: CPT | Performed by: STUDENT IN AN ORGANIZED HEALTH CARE EDUCATION/TRAINING PROGRAM

## 2023-04-25 PROCEDURE — 99213 OFFICE O/P EST LOW 20 MIN: CPT | Performed by: STUDENT IN AN ORGANIZED HEALTH CARE EDUCATION/TRAINING PROGRAM

## 2023-04-28 ENCOUNTER — OFFICE VISIT (OUTPATIENT)
Dept: ORTHOPEDIC SURGERY | Age: 58
End: 2023-04-28

## 2023-04-28 VITALS — HEIGHT: 71 IN | RESPIRATION RATE: 16 BRPM | WEIGHT: 238 LBS | BODY MASS INDEX: 33.32 KG/M2

## 2023-04-28 DIAGNOSIS — M75.101 TEAR OF RIGHT ROTATOR CUFF, UNSPECIFIED TEAR EXTENT, UNSPECIFIED WHETHER TRAUMATIC: Primary | ICD-10-CM

## 2023-04-28 RX ORDER — SODIUM CHLORIDE 0.9 % (FLUSH) 0.9 %
5-40 SYRINGE (ML) INJECTION EVERY 12 HOURS SCHEDULED
OUTPATIENT
Start: 2023-04-28

## 2023-04-28 RX ORDER — SODIUM CHLORIDE 9 MG/ML
INJECTION, SOLUTION INTRAVENOUS PRN
OUTPATIENT
Start: 2023-04-28

## 2023-04-28 RX ORDER — SODIUM CHLORIDE 0.9 % (FLUSH) 0.9 %
5-40 SYRINGE (ML) INJECTION PRN
OUTPATIENT
Start: 2023-04-28

## 2023-04-28 RX ORDER — ONDANSETRON 4 MG/1
4 TABLET, FILM COATED ORAL DAILY PRN
Qty: 20 TABLET | Refills: 0 | Status: SHIPPED | OUTPATIENT
Start: 2023-04-28

## 2023-04-28 RX ORDER — HYDROCODONE BITARTRATE AND ACETAMINOPHEN 5; 325 MG/1; MG/1
1 TABLET ORAL EVERY 4 HOURS PRN
Qty: 42 TABLET | Refills: 0 | Status: SHIPPED | OUTPATIENT
Start: 2023-04-28 | End: 2023-05-05

## 2023-04-28 RX ORDER — ACETAMINOPHEN 325 MG/1
1000 TABLET ORAL ONCE
OUTPATIENT
Start: 2023-04-28 | End: 2023-04-28

## 2023-04-28 RX ORDER — GABAPENTIN 300 MG/1
300 CAPSULE ORAL DAILY
Qty: 7 CAPSULE | Refills: 0 | Status: SHIPPED | OUTPATIENT
Start: 2023-04-28 | End: 2023-05-05

## 2023-04-28 NOTE — PROGRESS NOTES
HPI: Mr. Merlyn Gr is a 31-year-old here today for his preoperative visit regarding right shoulder. He has a full-thickness rotator cuff tear that occurred traumatically from an injury at work. He is elected to proceed with surgery by way of an arthroscopic rotator cuff repair. We once again had a discussion about the details of the procedure, postoperative recovery course and expected outcome. Evaluation of his shoulder demonstrates intact skin without warmth erythema or swelling. He was provided his prescriptions for postoperative analgesia and a sling to protect his shoulder leading up to and following surgery. He has obtained appropriate preoperative medical clearance. All questions were answered. We will proceed with surgery as currently scheduled. Pt in office with mom for asthma follow up. Pt last evaluated on 6/10. Mom states she has an asthma attack about every month. Last asthma attack was last week. Mom stated there's no pattern and they come randomly. During attack, pt used inhaler which helped stop the attack.      Mom also would like refill of Cereva cream She has cervical adenopathy (2 small mobile on anterior chain). Neurological: She is alert. Skin: Skin is warm. Capillary refill takes less than 2 seconds. Rash noted. She is not diaphoretic. Dry skin and eczema patches noted to antecubital fossa regions   Nursing note and vitals reviewed. ASSESSMENT/PLAN:  1. Moderate persistent asthma without complication    - montelukast (SINGULAIR) 4 MG chewable tablet; Take 1 tablet by mouth every evening  Dispense: 30 tablet; Refill: 3    2. Allergic shiners    - montelukast (SINGULAIR) 4 MG chewable tablet; Take 1 tablet by mouth every evening  Dispense: 30 tablet; Refill: 3    3. Allergic rhinitis, unspecified seasonality, unspecified trigger    - montelukast (SINGULAIR) 4 MG chewable tablet; Take 1 tablet by mouth every evening  Dispense: 30 tablet; Refill: 3    4. OME (otitis media with effusion), bilateral      5. Intrinsic eczema    - Emollient (CERAVE) CREA; Apply 2 times daily and as needed  Dispense: 453 g; Refill: 3    Patient Instructions     Patient Education     Continue asthma medications  Start on Singulair as ordered  Follow up in next 2 weeks for flu vaccine       Asthma in Children: Care Instructions  Your Care Instructions  Asthma makes it hard for your child to breathe. During an asthma attack, the airways swell and narrow. Severe asthma attacks can be life-threatening, but you can usually prevent them. Controlling asthma and treating symptoms before they get bad can help your child avoid bad attacks. You may also avoid future trips to the doctor. Follow-up care is a key part of your child's treatment and safety. Be sure to make and go to all appointments, and call your doctor if your child is having problems. It's also a good idea to know your child's test results and keep a list of the medicines your child takes. How can you care for your child at home?   Action plan    · Make and follow an asthma action plan.  It lists the medicines your

## 2023-05-01 ENCOUNTER — ANESTHESIA EVENT (OUTPATIENT)
Dept: OPERATING ROOM | Age: 58
End: 2023-05-01
Payer: COMMERCIAL

## 2023-05-02 ENCOUNTER — ANESTHESIA (OUTPATIENT)
Dept: OPERATING ROOM | Age: 58
End: 2023-05-02
Payer: COMMERCIAL

## 2023-05-02 ENCOUNTER — HOSPITAL ENCOUNTER (OUTPATIENT)
Age: 58
Setting detail: OUTPATIENT SURGERY
Discharge: HOME | End: 2023-05-02
Attending: ORTHOPAEDIC SURGERY | Admitting: ORTHOPAEDIC SURGERY
Payer: COMMERCIAL

## 2023-05-02 VITALS
SYSTOLIC BLOOD PRESSURE: 106 MMHG | BODY MASS INDEX: 33.18 KG/M2 | RESPIRATION RATE: 21 BRPM | OXYGEN SATURATION: 92 % | HEART RATE: 48 BPM | WEIGHT: 237 LBS | TEMPERATURE: 97.1 F | DIASTOLIC BLOOD PRESSURE: 60 MMHG | HEIGHT: 71 IN

## 2023-05-02 PROCEDURE — 3700000001 HC ADD 15 MINUTES (ANESTHESIA): Performed by: ORTHOPAEDIC SURGERY

## 2023-05-02 PROCEDURE — 2580000003 HC RX 258: Performed by: ORTHOPAEDIC SURGERY

## 2023-05-02 PROCEDURE — 7100000000 HC PACU RECOVERY - FIRST 15 MIN: Performed by: ORTHOPAEDIC SURGERY

## 2023-05-02 PROCEDURE — 6360000002 HC RX W HCPCS: Performed by: ORTHOPAEDIC SURGERY

## 2023-05-02 PROCEDURE — 3600000014 HC SURGERY LEVEL 4 ADDTL 15MIN: Performed by: ORTHOPAEDIC SURGERY

## 2023-05-02 PROCEDURE — 7100000011 HC PHASE II RECOVERY - ADDTL 15 MIN: Performed by: ORTHOPAEDIC SURGERY

## 2023-05-02 PROCEDURE — C1713 ANCHOR/SCREW BN/BN,TIS/BN: HCPCS | Performed by: ORTHOPAEDIC SURGERY

## 2023-05-02 PROCEDURE — 6370000000 HC RX 637 (ALT 250 FOR IP)

## 2023-05-02 PROCEDURE — C9290 INJ, BUPIVACAINE LIPOSOME: HCPCS | Performed by: ANESTHESIOLOGY

## 2023-05-02 PROCEDURE — 6360000002 HC RX W HCPCS: Performed by: ANESTHESIOLOGY

## 2023-05-02 PROCEDURE — 3700000000 HC ANESTHESIA ATTENDED CARE: Performed by: ORTHOPAEDIC SURGERY

## 2023-05-02 PROCEDURE — 7100000001 HC PACU RECOVERY - ADDTL 15 MIN: Performed by: ORTHOPAEDIC SURGERY

## 2023-05-02 PROCEDURE — 6360000002 HC RX W HCPCS

## 2023-05-02 PROCEDURE — 2709999900 HC NON-CHARGEABLE SUPPLY: Performed by: ORTHOPAEDIC SURGERY

## 2023-05-02 PROCEDURE — 2720000010 HC SURG SUPPLY STERILE: Performed by: ORTHOPAEDIC SURGERY

## 2023-05-02 PROCEDURE — 2580000003 HC RX 258: Performed by: ANESTHESIOLOGY

## 2023-05-02 PROCEDURE — 3600000004 HC SURGERY LEVEL 4 BASE: Performed by: ORTHOPAEDIC SURGERY

## 2023-05-02 PROCEDURE — 7100000010 HC PHASE II RECOVERY - FIRST 15 MIN: Performed by: ORTHOPAEDIC SURGERY

## 2023-05-02 PROCEDURE — 64415 NJX AA&/STRD BRCH PLXS IMG: CPT | Performed by: ANESTHESIOLOGY

## 2023-05-02 PROCEDURE — 2500000003 HC RX 250 WO HCPCS: Performed by: ANESTHESIOLOGY

## 2023-05-02 DEVICE — ANCHOR BONE SP FBRTAK RC TGRTPE WH/BLK: Type: IMPLANTABLE DEVICE | Site: SHOULDER | Status: FUNCTIONAL

## 2023-05-02 DEVICE — ANCHOR SUTURE BIOCOMP 4.75X19.1 MM SWIVELOCK C: Type: IMPLANTABLE DEVICE | Site: SHOULDER | Status: FUNCTIONAL

## 2023-05-02 RX ORDER — ROCURONIUM BROMIDE 10 MG/ML
INJECTION, SOLUTION INTRAVENOUS PRN
Status: DISCONTINUED | OUTPATIENT
Start: 2023-05-02 | End: 2023-05-02 | Stop reason: SDUPTHER

## 2023-05-02 RX ORDER — SODIUM CHLORIDE 9 MG/ML
25 INJECTION, SOLUTION INTRAVENOUS PRN
Status: CANCELLED | OUTPATIENT
Start: 2023-05-02

## 2023-05-02 RX ORDER — NEOSTIGMINE METHYLSULFATE 5 MG/5 ML
SYRINGE (ML) INTRAVENOUS PRN
Status: DISCONTINUED | OUTPATIENT
Start: 2023-05-02 | End: 2023-05-02 | Stop reason: SDUPTHER

## 2023-05-02 RX ORDER — SODIUM CHLORIDE 0.9 % (FLUSH) 0.9 %
5-40 SYRINGE (ML) INJECTION EVERY 12 HOURS SCHEDULED
Status: DISCONTINUED | OUTPATIENT
Start: 2023-05-02 | End: 2023-05-02 | Stop reason: HOSPADM

## 2023-05-02 RX ORDER — SODIUM CHLORIDE, SODIUM LACTATE, POTASSIUM CHLORIDE, CALCIUM CHLORIDE 600; 310; 30; 20 MG/100ML; MG/100ML; MG/100ML; MG/100ML
INJECTION, SOLUTION INTRAVENOUS CONTINUOUS PRN
Status: COMPLETED | OUTPATIENT
Start: 2023-05-02 | End: 2023-05-02

## 2023-05-02 RX ORDER — CEFAZOLIN 2 G/1
INJECTION, POWDER, FOR SOLUTION INTRAMUSCULAR; INTRAVENOUS
Status: DISCONTINUED
Start: 2023-05-02 | End: 2023-05-02 | Stop reason: HOSPADM

## 2023-05-02 RX ORDER — DEXAMETHASONE SODIUM PHOSPHATE 10 MG/ML
10 INJECTION, SOLUTION INTRAMUSCULAR; INTRAVENOUS ONCE
Status: DISCONTINUED | OUTPATIENT
Start: 2023-05-02 | End: 2023-05-02 | Stop reason: HOSPADM

## 2023-05-02 RX ORDER — PROMETHAZINE HYDROCHLORIDE 25 MG/ML
6.25 INJECTION, SOLUTION INTRAMUSCULAR; INTRAVENOUS EVERY 5 MIN PRN
Status: CANCELLED | OUTPATIENT
Start: 2023-05-02

## 2023-05-02 RX ORDER — GLYCOPYRROLATE 0.2 MG/ML
0.4 INJECTION INTRAMUSCULAR; INTRAVENOUS ONCE
Status: CANCELLED | OUTPATIENT
Start: 2023-05-02 | End: 2023-05-02

## 2023-05-02 RX ORDER — GLYCOPYRROLATE 1 MG/5 ML
SYRINGE (ML) INTRAVENOUS PRN
Status: DISCONTINUED | OUTPATIENT
Start: 2023-05-02 | End: 2023-05-02 | Stop reason: SDUPTHER

## 2023-05-02 RX ORDER — DEXMEDETOMIDINE HYDROCHLORIDE 100 UG/ML
INJECTION, SOLUTION INTRAVENOUS PRN
Status: DISCONTINUED | OUTPATIENT
Start: 2023-05-02 | End: 2023-05-02 | Stop reason: SDUPTHER

## 2023-05-02 RX ORDER — MEPERIDINE HYDROCHLORIDE 50 MG/ML
12.5 INJECTION INTRAMUSCULAR; INTRAVENOUS; SUBCUTANEOUS EVERY 5 MIN PRN
Status: CANCELLED | OUTPATIENT
Start: 2023-05-02

## 2023-05-02 RX ORDER — LIDOCAINE HYDROCHLORIDE 10 MG/ML
INJECTION, SOLUTION EPIDURAL; INFILTRATION; INTRACAUDAL; PERINEURAL PRN
Status: DISCONTINUED | OUTPATIENT
Start: 2023-05-02 | End: 2023-05-02 | Stop reason: SDUPTHER

## 2023-05-02 RX ORDER — FENTANYL CITRATE 50 UG/ML
100 INJECTION, SOLUTION INTRAMUSCULAR; INTRAVENOUS ONCE
Status: COMPLETED | OUTPATIENT
Start: 2023-05-02 | End: 2023-05-02

## 2023-05-02 RX ORDER — ACETAMINOPHEN 500 MG
TABLET ORAL
Status: COMPLETED
Start: 2023-05-02 | End: 2023-05-02

## 2023-05-02 RX ORDER — SODIUM CHLORIDE 0.9 % (FLUSH) 0.9 %
5-40 SYRINGE (ML) INJECTION PRN
Status: DISCONTINUED | OUTPATIENT
Start: 2023-05-02 | End: 2023-05-02 | Stop reason: HOSPADM

## 2023-05-02 RX ORDER — KETOROLAC TROMETHAMINE 30 MG/ML
INJECTION, SOLUTION INTRAMUSCULAR; INTRAVENOUS PRN
Status: DISCONTINUED | OUTPATIENT
Start: 2023-05-02 | End: 2023-05-02 | Stop reason: SDUPTHER

## 2023-05-02 RX ORDER — BUPIVACAINE HYDROCHLORIDE 5 MG/ML
INJECTION, SOLUTION EPIDURAL; INTRACAUDAL
Status: COMPLETED | OUTPATIENT
Start: 2023-05-02 | End: 2023-05-02

## 2023-05-02 RX ORDER — ONDANSETRON 2 MG/ML
4 INJECTION INTRAMUSCULAR; INTRAVENOUS
Status: CANCELLED | OUTPATIENT
Start: 2023-05-02 | End: 2023-05-03

## 2023-05-02 RX ORDER — SODIUM CHLORIDE 0.9 % (FLUSH) 0.9 %
5-40 SYRINGE (ML) INJECTION PRN
Status: CANCELLED | OUTPATIENT
Start: 2023-05-02

## 2023-05-02 RX ORDER — DEXAMETHASONE SODIUM PHOSPHATE 10 MG/ML
INJECTION, SOLUTION INTRAMUSCULAR; INTRAVENOUS
Status: DISCONTINUED
Start: 2023-05-02 | End: 2023-05-02 | Stop reason: HOSPADM

## 2023-05-02 RX ORDER — BUPIVACAINE HYDROCHLORIDE 5 MG/ML
INJECTION, SOLUTION EPIDURAL; INTRACAUDAL
Status: COMPLETED
Start: 2023-05-02 | End: 2023-05-02

## 2023-05-02 RX ORDER — OXYCODONE HYDROCHLORIDE AND ACETAMINOPHEN 5; 325 MG/1; MG/1
2 TABLET ORAL
Status: CANCELLED | OUTPATIENT
Start: 2023-05-02 | End: 2023-05-03

## 2023-05-02 RX ORDER — ONDANSETRON 2 MG/ML
INJECTION INTRAMUSCULAR; INTRAVENOUS PRN
Status: DISCONTINUED | OUTPATIENT
Start: 2023-05-02 | End: 2023-05-02 | Stop reason: SDUPTHER

## 2023-05-02 RX ORDER — ACETAMINOPHEN 500 MG
1000 TABLET ORAL ONCE
Status: COMPLETED | OUTPATIENT
Start: 2023-05-02 | End: 2023-05-02

## 2023-05-02 RX ORDER — SODIUM CHLORIDE, SODIUM LACTATE, POTASSIUM CHLORIDE, CALCIUM CHLORIDE 600; 310; 30; 20 MG/100ML; MG/100ML; MG/100ML; MG/100ML
INJECTION, SOLUTION INTRAVENOUS CONTINUOUS
Status: DISCONTINUED | OUTPATIENT
Start: 2023-05-02 | End: 2023-05-02 | Stop reason: HOSPADM

## 2023-05-02 RX ORDER — HYDRALAZINE HYDROCHLORIDE 20 MG/ML
10 INJECTION INTRAMUSCULAR; INTRAVENOUS
Status: CANCELLED | OUTPATIENT
Start: 2023-05-02

## 2023-05-02 RX ORDER — METOCLOPRAMIDE HYDROCHLORIDE 5 MG/ML
10 INJECTION INTRAMUSCULAR; INTRAVENOUS
Status: CANCELLED | OUTPATIENT
Start: 2023-05-02 | End: 2023-05-03

## 2023-05-02 RX ORDER — PHENYLEPHRINE HCL IN 0.9% NACL 1 MG/10 ML
SYRINGE (ML) INTRAVENOUS PRN
Status: DISCONTINUED | OUTPATIENT
Start: 2023-05-02 | End: 2023-05-02 | Stop reason: SDUPTHER

## 2023-05-02 RX ORDER — MIDAZOLAM HYDROCHLORIDE 2 MG/2ML
2 INJECTION, SOLUTION INTRAMUSCULAR; INTRAVENOUS
Status: CANCELLED | OUTPATIENT
Start: 2023-05-02 | End: 2023-05-03

## 2023-05-02 RX ORDER — SODIUM CHLORIDE 9 MG/ML
INJECTION, SOLUTION INTRAVENOUS PRN
Status: DISCONTINUED | OUTPATIENT
Start: 2023-05-02 | End: 2023-05-02 | Stop reason: HOSPADM

## 2023-05-02 RX ORDER — MIDAZOLAM HYDROCHLORIDE 2 MG/2ML
2 INJECTION, SOLUTION INTRAMUSCULAR; INTRAVENOUS ONCE
Status: COMPLETED | OUTPATIENT
Start: 2023-05-02 | End: 2023-05-02

## 2023-05-02 RX ORDER — OXYCODONE HYDROCHLORIDE AND ACETAMINOPHEN 5; 325 MG/1; MG/1
1 TABLET ORAL
Status: CANCELLED | OUTPATIENT
Start: 2023-05-02 | End: 2023-05-03

## 2023-05-02 RX ORDER — LABETALOL HYDROCHLORIDE 5 MG/ML
10 INJECTION, SOLUTION INTRAVENOUS
Status: CANCELLED | OUTPATIENT
Start: 2023-05-02

## 2023-05-02 RX ORDER — FENTANYL CITRATE 50 UG/ML
INJECTION, SOLUTION INTRAMUSCULAR; INTRAVENOUS
Status: COMPLETED
Start: 2023-05-02 | End: 2023-05-02

## 2023-05-02 RX ORDER — MIDAZOLAM HYDROCHLORIDE 1 MG/ML
INJECTION INTRAMUSCULAR; INTRAVENOUS
Status: COMPLETED
Start: 2023-05-02 | End: 2023-05-02

## 2023-05-02 RX ORDER — DIPHENHYDRAMINE HYDROCHLORIDE 50 MG/ML
12.5 INJECTION INTRAMUSCULAR; INTRAVENOUS
Status: CANCELLED | OUTPATIENT
Start: 2023-05-02 | End: 2023-05-03

## 2023-05-02 RX ORDER — IPRATROPIUM BROMIDE AND ALBUTEROL SULFATE 2.5; .5 MG/3ML; MG/3ML
1 SOLUTION RESPIRATORY (INHALATION)
Status: CANCELLED | OUTPATIENT
Start: 2023-05-02 | End: 2023-05-03

## 2023-05-02 RX ORDER — MORPHINE SULFATE 2 MG/ML
2 INJECTION, SOLUTION INTRAMUSCULAR; INTRAVENOUS EVERY 5 MIN PRN
Status: CANCELLED | OUTPATIENT
Start: 2023-05-02

## 2023-05-02 RX ORDER — DEXAMETHASONE SODIUM PHOSPHATE 10 MG/ML
INJECTION, SOLUTION INTRAMUSCULAR; INTRAVENOUS PRN
Status: DISCONTINUED | OUTPATIENT
Start: 2023-05-02 | End: 2023-05-02 | Stop reason: SDUPTHER

## 2023-05-02 RX ORDER — PROPOFOL 10 MG/ML
INJECTION, EMULSION INTRAVENOUS PRN
Status: DISCONTINUED | OUTPATIENT
Start: 2023-05-02 | End: 2023-05-02 | Stop reason: SDUPTHER

## 2023-05-02 RX ORDER — SODIUM CHLORIDE 0.9 % (FLUSH) 0.9 %
5-40 SYRINGE (ML) INJECTION EVERY 12 HOURS SCHEDULED
Status: CANCELLED | OUTPATIENT
Start: 2023-05-02

## 2023-05-02 RX ADMIN — BUPIVACAINE HYDROCHLORIDE 10 ML: 5 INJECTION, SOLUTION EPIDURAL; INTRACAUDAL; PERINEURAL at 10:43

## 2023-05-02 RX ADMIN — BUPIVACAINE 10 ML: 13.3 INJECTION, SUSPENSION, LIPOSOMAL INFILTRATION at 10:43

## 2023-05-02 RX ADMIN — Medication 0.2 MG: at 11:53

## 2023-05-02 RX ADMIN — Medication 100 MCG: at 11:39

## 2023-05-02 RX ADMIN — DEXMEDETOMIDINE HCL 4 MCG: 100 INJECTION INTRAVENOUS at 11:18

## 2023-05-02 RX ADMIN — Medication 0.2 MG: at 12:25

## 2023-05-02 RX ADMIN — Medication 100 MCG: at 11:31

## 2023-05-02 RX ADMIN — Medication 150 MCG: at 12:06

## 2023-05-02 RX ADMIN — KETOROLAC TROMETHAMINE 30 MG: 30 INJECTION, SOLUTION INTRAMUSCULAR; INTRAVENOUS at 12:25

## 2023-05-02 RX ADMIN — MIDAZOLAM HYDROCHLORIDE 2 MG: 2 INJECTION, SOLUTION INTRAMUSCULAR; INTRAVENOUS at 10:38

## 2023-05-02 RX ADMIN — Medication 150 MCG: at 11:49

## 2023-05-02 RX ADMIN — PHENYLEPHRINE HYDROCHLORIDE 25 MCG/MIN: 10 INJECTION INTRAVENOUS at 12:04

## 2023-05-02 RX ADMIN — CEFAZOLIN 2000 MG: 2 INJECTION, POWDER, FOR SOLUTION INTRAMUSCULAR; INTRAVENOUS at 11:07

## 2023-05-02 RX ADMIN — ONDANSETRON 4 MG: 2 INJECTION INTRAMUSCULAR; INTRAVENOUS at 12:25

## 2023-05-02 RX ADMIN — SODIUM CHLORIDE, POTASSIUM CHLORIDE, SODIUM LACTATE AND CALCIUM CHLORIDE: 600; 310; 30; 20 INJECTION, SOLUTION INTRAVENOUS at 12:38

## 2023-05-02 RX ADMIN — ACETAMINOPHEN 1000 MG: 500 TABLET ORAL at 10:22

## 2023-05-02 RX ADMIN — MIDAZOLAM 2 MG: 1 INJECTION INTRAMUSCULAR; INTRAVENOUS at 10:38

## 2023-05-02 RX ADMIN — DEXAMETHASONE SODIUM PHOSPHATE 10 MG: 10 INJECTION, SOLUTION INTRAMUSCULAR; INTRAVENOUS at 11:16

## 2023-05-02 RX ADMIN — DEXMEDETOMIDINE HCL 4 MCG: 100 INJECTION INTRAVENOUS at 11:13

## 2023-05-02 RX ADMIN — ROCURONIUM BROMIDE 50 MG: 10 INJECTION, SOLUTION INTRAVENOUS at 10:58

## 2023-05-02 RX ADMIN — LIDOCAINE HYDROCHLORIDE 40 MG: 10 INJECTION, SOLUTION EPIDURAL; INFILTRATION; INTRACAUDAL; PERINEURAL at 10:58

## 2023-05-02 RX ADMIN — FENTANYL CITRATE 100 MCG: 50 INJECTION, SOLUTION INTRAMUSCULAR; INTRAVENOUS at 10:38

## 2023-05-02 RX ADMIN — SODIUM CHLORIDE, POTASSIUM CHLORIDE, SODIUM LACTATE AND CALCIUM CHLORIDE: 600; 310; 30; 20 INJECTION, SOLUTION INTRAVENOUS at 10:17

## 2023-05-02 RX ADMIN — DEXMEDETOMIDINE HCL 4 MCG: 100 INJECTION INTRAVENOUS at 11:24

## 2023-05-02 RX ADMIN — PROPOFOL 200 MG: 10 INJECTION, EMULSION INTRAVENOUS at 10:58

## 2023-05-02 RX ADMIN — Medication 100 MCG: at 11:44

## 2023-05-02 RX ADMIN — Medication 2 MG: at 12:25

## 2023-05-02 RX ADMIN — DEXMEDETOMIDINE HCL 4 MCG: 100 INJECTION INTRAVENOUS at 11:21

## 2023-05-02 RX ADMIN — Medication 200 MCG: at 11:56

## 2023-05-02 RX ADMIN — Medication 0.2 MG: at 11:39

## 2023-05-02 RX ADMIN — Medication 1000 MG: at 10:22

## 2023-05-02 ASSESSMENT — PAIN - FUNCTIONAL ASSESSMENT
PAIN_FUNCTIONAL_ASSESSMENT: 0-10
PAIN_FUNCTIONAL_ASSESSMENT: PREVENTS OR INTERFERES SOME ACTIVE ACTIVITIES AND ADLS

## 2023-05-02 ASSESSMENT — PAIN DESCRIPTION - ORIENTATION: ORIENTATION: RIGHT

## 2023-05-02 ASSESSMENT — PAIN SCALES - GENERAL
PAINLEVEL_OUTOF10: 1
PAINLEVEL_OUTOF10: 1

## 2023-05-02 ASSESSMENT — PAIN DESCRIPTION - DESCRIPTORS
DESCRIPTORS: ACHING
DESCRIPTORS: ACHING

## 2023-05-02 ASSESSMENT — PAIN DESCRIPTION - LOCATION: LOCATION: SHOULDER

## 2023-05-02 NOTE — DISCHARGE INSTRUCTIONS
Moises Ma MD  [facility] [facility] in [facility]      POSTOPERATIVE INSTRUCTIONS  Surgery: Arthroscopic Rotator Cuff Repair    DIET  Begin with clear liquids and light foods (jellos, soups, etc.). Progress to your normal diet if you are not nauseated. WOUND CARE  Maintain your operative dressing, keeping it clean and dry. It is normal for the shoulder to bleed and swell following surgery - if blood soaks the bandage, do not become alarmed - reinforce with additional dressing. Remove surgical dressing on the second post-operative day - apply clean dressing (gauze and tape) and change daily. To avoid infection, keep surgical incisions clean and dry - you may shower by placing Seran wrap or Press and Seal over the surgical site before a shower, and afterwards, take everything off and apply clean gauze dressings - NO immersion of operative arm (i.e. bath). Alternatively, after youve changed your dressing for the first time you can place waterproof band aids over your incisions to take a shower and then a apply a clean new dressing afterwards    MEDICATIONS  Your nerve block should wear off in 24-36 hours. Start taking your prescribed pain medication before it does. Most patients will require some narcotic pain medication for a short period of time - this can be taken as per directions on the bottle. Common side effects of the pain medication are nausea, drowsiness, and  constipation - to decrease the side effects, take medication with food - if  constipation occurs, consider taking an over-the-counter laxative. If you are having problems with nausea and vomiting, take your prescribed anti-nausea medication if provided, otherwise contact the office to possibly  have your medication changed ([phone]). Do not drive a car or operate machinery while taking the narcotic medication.   If you are not allergic, Ibuprofen 200-600mg (i.e. Advil)

## 2023-05-02 NOTE — ANESTHESIA PROCEDURE NOTES
Peripheral Block    Patient location during procedure: pre-op  Reason for block: post-op pain management and at surgeon's request  Start time: 5/2/2023 10:39 AM  End time: 5/2/2023 10:43 AM  Staffing  Performed: anesthesiologist   Anesthesiologist: Loyda Dukes MD  Preanesthetic Checklist  Completed: patient identified, IV checked, site marked, risks and benefits discussed, surgical/procedural consents, equipment checked, pre-op evaluation, timeout performed, anesthesia consent given, oxygen available, monitors applied/VS acknowledged, fire risk safety assessment completed and verbalized and blood product R/B/A discussed and consented  Peripheral Block   Patient position: sitting  Prep: ChloraPrep  Provider prep: mask and sterile gloves  Patient monitoring: cardiac monitor, continuous pulse ox and frequent blood pressure checks  Block type: Brachial plexus  Interscalene  Laterality: right  Injection technique: single-shot  Guidance: nerve stimulator and ultrasound guided    Needle   Needle type: insulated echogenic nerve stimulator needle   Needle gauge: 22 G  Needle localization: anatomical landmarks, nerve stimulator and ultrasound guidance  Needle length: 2 IN.   Assessment   Injection assessment: negative aspiration for heme, no paresthesia on injection, local visualized surrounding nerve on ultrasound and no intravascular symptoms  Paresthesia pain: none  Slow fractionated injection: yes  Hemodynamics: stableno  Outcomes: patient tolerated procedure well    Additional Notes  (+) RIGHT DELTOID TWITCH FROM 1.5MA DOWN TO 0.7MA  Medications Administered  bupivacaine (MARCAINE) PF injection 0.5% - Perineural   10 mL - 5/2/2023 10:43:00 AM  bupivacaine liposome (EXPAREL) injection 1.3% - Perineural   10 mL - 5/2/2023 10:43:00 AM

## 2023-05-02 NOTE — OP NOTE
prominences. SCDs and TEDs were placed on both legs. The right upper extremity was prepped and draped in the usual sterile fashion. The patient finished a course of pre-operative antibiotics by way of 2 g of IV Ancef. A time out was performed during which the correct patient, surgical site, and planned procedure were identified and then confirmed by the circulating nurse and anesthetic team.     The joint was insuflated with 30cc saline and a standard posterior portal was established. Diagnostic arthroscopy revealed a full-thickness tear of the supraspinatus tendon with intact labrum and chondral surfaces and an inflamed biceps tendon. An anterior portal was then established. An extensive debridement of frayed soft tissue and inflamed synovium was performed. The frayed edges of the rotator cuff were debrided to smooth surfaces. Through a separate anterolateral fascial incision the biceps tendon was released off of the its insertion on the superior labrum and retrieved from the shoulder. Grasping [place] sutures were placed within the healthy portion of the tendon using #2 FiberWire sutures. The proximal inflamed section was resected. My attention was then directed to the subacromial space. Through a separate lateral fascial incision, the subacromial space was entered. A thorough bursectomy was performed. The rotator cuff was identified and noted to be a small to medium sized full-thickness tear of the supraspinatus with minimal retraction. The footprint of the rotator cuff was debrided to bleeding bone. An Arthrex fiber tack RC anchor was placed along the anterior articular margin of the footprint. Its suture tape and fiber tape sutures were passed through the anterior aspect of the torn supraspinatus tendon. The suture tape sutures were passed in horizontal mattress fashion while the fiber tape suture was passed in between the limbs of the suture tape sutures medially. This created a ripstop pattern.

## 2023-05-02 NOTE — ANESTHESIA PRE PROCEDURE
Department of Anesthesiology  Preprocedure Note       Name:  Ana Mabry   Age:  56 y.o.  :  1965                                          MRN:  [id number]         Date:  2023      Surgeon: Richelle Segovia):  Altaf Sanderson MD    Procedure: Procedure(s):  RIGHT SHOULDER ARTHROSCOPIC ROTATOR CUFF REPAIR WITH ARTHREX AND PRE-OP INTERSCALENE BLOCK WITH EXPAREL    Medications prior to admission:   Prior to Admission medications    Medication Sig Start Date End Date Taking? Authorizing Provider   HYDROcodone-acetaminophen (NORCO) 5-325 MG per tablet Take 1 tablet by mouth every 4 hours as needed for Pain for up to 7 days. Max Daily Amount: 6 tablets 23  Altaf Sanderson MD   ondansetron (ZOFRAN) 4 MG tablet Take 1 tablet by mouth daily as needed for Nausea or Vomiting 23   Altaf Sanderson MD   gabapentin (NEURONTIN) 300 MG capsule Take 1 capsule by mouth daily for 7 doses. 23  Altaf Sanderson MD   lisinopril (PRINIVIL;ZESTRIL) 20 MG tablet Take 1 tablet by mouth daily 23   Historical Provider, MD   metoprolol tartrate (LOPRESSOR) 25 MG tablet Take 1 tablet by mouth 2 times daily 20   Souleymane Rojo MD   aspirin 81 MG chewable tablet Take 1 tablet by mouth daily. Patient taking differently: Take 1 tablet by mouth Daily with supper 14   Dara Macias MD   nitroGLYCERIN (NITROSTAT) 0.4 MG SL tablet Place 1 tablet under the tongue every 5 minutes as needed for Chest pain. Patient not taking: Reported on 2023   Dara Macias MD   atorvastatin (LIPITOR) 80 MG tablet Take 1 tablet by mouth nightly.   Patient taking differently: Take 1 tablet by mouth Daily with supper 14   Dara Macias MD       Current medications:    Current Facility-Administered Medications   Medication Dose Route Frequency Provider Last Rate Last Admin    lactated ringers IV soln infusion   IntraVENous Continuous Lisa Smith  mL/hr at 23 1017 New Bag at 23 1017    sodium

## 2023-05-02 NOTE — ANESTHESIA POSTPROCEDURE EVALUATION
Department of Anesthesiology  Postprocedure Note    Patient: Lauren Zambrano  MRN: [id number]  YOB: 1965  Date of evaluation: 5/2/2023      Procedure Summary     Date: 05/02/23 Room / Location: [facility] OR [address] [place] [address]) [facility]    Anesthesia Start: 3947 Anesthesia Stop: 3061    Procedure: RIGHT SHOULDER ARTHROSCOPIC ROTATOR CUFF REPAIR WITH ARTHREX, BICEPS TENDONESIS  AND PRE-OP INTERSCALENE BLOCK WITH EXPAREL (Right: Shoulder) Diagnosis:       Tear of right rotator cuff, unspecified tear extent, unspecified whether traumatic      (Tear of right rotator cuff, unspecified tear extent, unspecified whether traumatic [M75.101])    Surgeons: Mariia Ray MD Responsible Provider: Daryle Austria, MD    Anesthesia Type: general, regional ASA Status: 3          Anesthesia Type: No value filed.     Nathanael Phase I: Nathanael Score: 8    Nathanael Phase II:        Anesthesia Post Evaluation    Patient location during evaluation: PACU  Patient participation: complete - patient participated  Level of consciousness: awake and alert  Airway patency: patent  Nausea & Vomiting: no nausea and no vomiting  Complications: no  Cardiovascular status: hemodynamically stable  Respiratory status: room air and spontaneous ventilation  Hydration status: euvolemic  Multimodal analgesia pain management approach

## 2023-05-02 NOTE — BRIEF OP NOTE
Brief Postoperative Note      Patient: Clarence Trent  YOB: 1965  MRN: [id number]    Date of Procedure: 5/2/2023    Pre-Op Diagnosis Codes:     * Tear of right rotator cuff, unspecified tear extent, unspecified whether traumatic [M75.101]    Post-Op Diagnosis: Same; biceps tendinitis       Procedure(s):  RIGHT SHOULDER ARTHROSCOPIC ROTATOR CUFF REPAIR WITH ARTHREX, BICEPS TENDONESIS  AND PRE-OP INTERSCALENE BLOCK WITH EXPAREL    Surgeon(s):  Stefani Singh MD    Assistant:  * No surgical staff found *    Anesthesia: General    Estimated Blood Loss (mL): Minimal    Complications: None    Specimens:   * No specimens in log *    Implants:  Implant Name Type Inv.  Item Serial No.  Lot No. LRB No. Used Action   ANCHOR BONE SP FBRTAK RC TGRTPE /BLK  - [id number]  [facility] BONE SP FBRTAK RC TGRTPE WH/BLK   [employer] [id number] Right 1 Implanted   ANCHOR BONE SP FBRTAK RC TGRTPE WH/BLK  - [id number]  [facility] BONE SP FBRTAK RC TGRTPE WH/BLK   [employer] [id number] Right 1 Implanted   [facility] SUTURE BIOCOMP 4.75X19.1 MM [place] [id number]  [facility] SUTURE BIOCOMP 4.75X19.1 MM [employer] [id number] Right 2 Implanted         Drains: * No LDAs found *    Findings: See operative report      Electronically signed by Stefani Singh MD on 5/2/2023 at 12:40 PM

## 2023-05-15 ENCOUNTER — OFFICE VISIT (OUTPATIENT)
Dept: ORTHOPEDIC SURGERY | Age: 58
End: 2023-05-15

## 2023-05-15 VITALS — WEIGHT: 238 LBS | RESPIRATION RATE: 14 BRPM | BODY MASS INDEX: 33.32 KG/M2 | HEIGHT: 71 IN

## 2023-05-15 DIAGNOSIS — Z98.890 STATUS POST RIGHT ROTATOR CUFF REPAIR: Primary | ICD-10-CM

## 2023-05-17 ENCOUNTER — HOSPITAL ENCOUNTER (OUTPATIENT)
Dept: PHYSICAL THERAPY | Age: 58
Setting detail: THERAPIES SERIES
Discharge: HOME | End: 2023-05-17
Payer: COMMERCIAL

## 2023-05-17 ENCOUNTER — TELEPHONE (OUTPATIENT)
Dept: ORTHOPEDIC SURGERY | Age: 58
End: 2023-05-17

## 2023-05-17 PROCEDURE — G0283 ELEC STIM OTHER THAN WOUND: HCPCS

## 2023-05-17 PROCEDURE — 97161 PT EVAL LOW COMPLEX 20 MIN: CPT

## 2023-05-17 PROCEDURE — 97110 THERAPEUTIC EXERCISES: CPT

## 2023-05-17 PROCEDURE — 97016 VASOPNEUMATIC DEVICE THERAPY: CPT

## 2023-05-17 NOTE — PROGRESS NOTES
[address] [facility]  [address] [facility]         Phone: [phone]       Fax: [phone]    Physical Therapy Upper Extremity Evaluation    Date:  2023  Patient: Tram Stanford  : 1965  MRN: [id number]  Physician: Adam Mitchell MD     Medical Diagnosis: (R) rotator cuff tear   Status post right rotator cuff repair (A10.120)   Rehab Codes: Onset date: 2023 (injury date) 2023 (surgery date)  Next 's appt.: 2023  PT Visit Information  PT Insurance Information: Worker's Compensation 3 x a week x 6 weeks (18 visits) (2023 - 2023)   Total # of Visits Approved: 18  Total # of Visits to Date: 1  No Show: 0  Canceled Appointment: 0       Subjective  CC: (R) shoulder pain   HPI: (2023) Pt is a 56year old male who presented with (R) shoulder pain due an injury that occurred at his workplace. He stated that on 2023 he was pulling a jug of water off a rack and felt a \"pop\" within his (R) shoulder. [facility]. He stated that he attended several visits without success. He returned to [facility] and [facility] and given the (+) imaging the was ordered he was referred to Dr. Catie Siddiqi for surgical consult.  It was deemed necessary that surgery was indicated and the patient underwent a (R) shoulder RTC repair along with a biceps tenodesis on 2023 @ [facility]. He was discharged to home in a shoulder immobilizer and was still utilizing  upon arrival.      PMHx: [] Unremarkable [] Diabetes [] HTN  [] Pacemaker   [] MI/Heart Problems [] Cancer [] Arthritis   [] Other:              [x] Refer to full medical chart  In EPIC     Comorbidities  [] Obesity [] Dialysis  [] Other:   [] Asthma/COPD [] Dementia [] Other:   [] Stroke [] Sleep apnea [] Other:   [] Vascular disease [] Rheumatic disease [] Other:     Tests: [x] X-Ray: (R) shoulder  Findings:

## 2023-05-17 NOTE — TELEPHONE ENCOUNTER
EBEN: Gissell Gan- physical therapist called in stating that he will see the patient 1 x a week until his next follow up appointment 6/12/23. He will then increase to 2-3 x a week 2020 23:11

## 2023-05-24 ENCOUNTER — HOSPITAL ENCOUNTER (OUTPATIENT)
Dept: PHYSICAL THERAPY | Age: 58
Setting detail: THERAPIES SERIES
Discharge: HOME | End: 2023-05-24
Payer: COMMERCIAL

## 2023-05-24 PROCEDURE — G0283 ELEC STIM OTHER THAN WOUND: HCPCS

## 2023-05-24 PROCEDURE — 97110 THERAPEUTIC EXERCISES: CPT

## 2023-05-24 PROCEDURE — 97016 VASOPNEUMATIC DEVICE THERAPY: CPT

## 2023-05-26 ENCOUNTER — HOSPITAL ENCOUNTER (OUTPATIENT)
Dept: PHYSICAL THERAPY | Age: 58
Setting detail: THERAPIES SERIES
Discharge: HOME | End: 2023-05-26
Payer: COMMERCIAL

## 2023-05-26 PROCEDURE — 97016 VASOPNEUMATIC DEVICE THERAPY: CPT

## 2023-05-26 PROCEDURE — G0283 ELEC STIM OTHER THAN WOUND: HCPCS

## 2023-05-26 NOTE — FLOWSHEET NOTE
[address]  Phone: [phone]  Fax: [phone]    Physical Therapy Daily Treatment Note    Date:  2023  Patient: Pavan Osorio  : 1965  MRN: [id number]  Physician: Evaristo Pedersen MD                                 Medical Diagnosis: (R) rotator cuff tear   Status post right rotator cuff repair (A63.969)      Rehab Codes: (R) shoulder pain (M25.511)  Onset date: 2023 (injury date) 2023 (surgery date)  Next Dr's appt.: 2023  PT Visit Information  PT Insurance Information: Worker's Compensation 3 x a week x 6 weeks (18 visits) (2023 - 2023)   Total # of Visits Approved: 18  Total # of Visits to Date: 3  No Show: 0  Canceled Appointment: 0    Subjective  Patient stated that his symptoms are relatively unchanged from his previous treatment session. He attempted to sleep in his bed for the first time last night and was unsuccessful. Sleep continues to be an issue, even when he uses his couch. He stated that he notices increased pain when he shaves in the shower with his (R) UE. The pain appears to become more severe when the shower is completed.      Pain  Pain:  [x] Yes   [] No       Location: (R) shoulder anterior/lateral region with radicular symptoms to the mid humerus  Pain Rating: (0-10 scale) 8-9/10 with pain medication   Worst: 8-9/10 with pain medication   Best: 2-3/10 with pain medication   Symptoms:  [] Improving       [] Worsening        [x] Same  Descriptors: constant, dull, achy, sharp, shooting    Objective  Modalities: Vasopneumatic compression with E-stim (IFC) to the (R) shoulder x 15 minutes - 34 degrees, shoulder sleeve, low compression  Precautions: s/p (R) RTC repair (3 weeks post-op 2023) - Dr. Natalee Zaragoza new protocol for a rotator cuff repair  Exercises:  Exercise Reps/ Time Weight/ Level Comments   Codman's Exercises  25 reps    CCW, CW, Forward/Backward, Side to Side

## 2023-05-30 ENCOUNTER — HOSPITAL ENCOUNTER (OUTPATIENT)
Dept: PHYSICAL THERAPY | Age: 58
Setting detail: THERAPIES SERIES
Discharge: HOME | End: 2023-05-30
Payer: COMMERCIAL

## 2023-05-30 PROCEDURE — G0283 ELEC STIM OTHER THAN WOUND: HCPCS

## 2023-05-30 PROCEDURE — 97016 VASOPNEUMATIC DEVICE THERAPY: CPT

## 2023-05-31 NOTE — FLOWSHEET NOTE
[facility]  [address]  Phone: [phone]  Fax: [phone]    Physical Therapy Daily Treatment Note    Date:  2023  Patient: Eugene Szymanski  : 1965  MRN: [id number]  Physician: Devora Givens MD                                 Medical Diagnosis: (R) rotator cuff tear   Status post right rotator cuff repair (R89.962)      Rehab Codes: (R) shoulder pain (M25.511)  Onset date: 2023 (injury date) 2023 (surgery date)  Next Dr's appt.: 2023  PT Visit Information  PT Insurance Information: Worker's Compensation 3 x a week x 6 weeks (18 visits) (2023 - 2023)   Total # of Visits Approved: 18  Total # of Visits to Date: 4  No Show: 0  Canceled Appointment: 0    Subjective  Patient stated that his symptoms have slightly improved. Moderate level pain. Pain  Pain:  [x] Yes   [] No       Location: (R) shoulder anterior/lateral region with radicular symptoms to the mid humerus  Pain Rating: (0-10 scale) 5/10 with pain medication   Worst: 8-9/10 with pain medication   Best: 2-3/10 with pain medication   Descriptors: constant, dull, achy, sharp, shooting    Objective  Modalities: Vasopneumatic compression with E-stim (IFC) to the (R) shoulder x 15 minutes - 34 degrees, shoulder sleeve, low compression  Precautions: s/p (R) RTC repair (4 weeks post-op 2023) - Dr. Grover Huber new protocol for a rotator cuff repair    Specific Instructions for next treatment: Continue to progress with Dr. Grover Huber new for a rotator cuff repair. Pt. Education:  [] Yes  [] No  [] Reviewed Prior HEP/Ed  Method of Education: [] Verbal  [] Demo  [] Written  HEP:   Comprehension of Education:  [] Verbalizes understanding. [] Demonstrates understanding. [] Needs review. [] Demonstrates/verbalizes HEP/Ed previously given. Assessment  Continued with Vasopneumatic compression with E-stim (IFC) for pain control.  Patient continues to

## 2023-06-02 ENCOUNTER — HOSPITAL ENCOUNTER (OUTPATIENT)
Dept: PHYSICAL THERAPY | Age: 58
Setting detail: THERAPIES SERIES
Discharge: HOME | End: 2023-06-02
Payer: COMMERCIAL

## 2023-06-02 PROCEDURE — G0283 ELEC STIM OTHER THAN WOUND: HCPCS

## 2023-06-02 PROCEDURE — 97016 VASOPNEUMATIC DEVICE THERAPY: CPT

## 2023-06-02 NOTE — FLOWSHEET NOTE
[place] [address] [facility] [address]  Phone: [phone]  Fax: [phone]    Physical Therapy Daily Treatment Note    Date:  2023  Patient: Jessi Rinaldi  : 1965  MRN: [id number]  Physician: Rachna Wolff MD                                 Medical Diagnosis: (R) rotator cuff tear   Status post right rotator cuff repair (P74.097)      Rehab Codes: (R) shoulder pain (M25.511)  Onset date: 2023 (injury date) 2023 (surgery date)  Next Dr's appt.: 2023  PT Visit Information  PT Insurance Information: Worker's Compensation 3 x a week x 6 weeks (18 visits) (2023 - 2023)   Total # of Visits Approved: 18  Total # of Visits to Date: 5  No Show: 0  Canceled Appointment: 0    Subjective  Patient stated that his symptoms are relatively unchanged from his previous treatment session. He is still having difficulty sleeping in his bed. He attempted to move from his couch to his bed without success. Pain  Pain:  [x] Yes   [] No       Location: (R) shoulder anterior/lateral region with radicular symptoms to the mid humerus  Pain Rating: (0-10 scale) 5/10 with pain medication   Worst: 8-9/10 with pain medication   Best: 2-3/10 with pain medication   Descriptors: constant, dull, achy, sharp, shooting    Objective  Modalities: Vasopneumatic compression with E-stim (IFC) to the (R) shoulder x 15 minutes - 34 degrees, shoulder sleeve, low compression  Precautions: s/p (R) RTC repair (4 weeks post-op 2023) - Dr. Bright smith protocol for a rotator cuff repair    Specific Instructions for next treatment: Continue to progress with Dr. Bright smith for a rotator cuff repair. Pt. Education:  [] Yes  [] No  [] Reviewed Prior HEP/Ed  Method of Education: [] Verbal  [] Demo  [] Written  HEP:   Comprehension of Education:  [] Verbalizes understanding. [] Demonstrates understanding. [] Needs review.   [] Demonstrates/verbalizes HEP/Ed

## 2023-06-06 ENCOUNTER — HOSPITAL ENCOUNTER (OUTPATIENT)
Dept: PHYSICAL THERAPY | Age: 58
Setting detail: THERAPIES SERIES
Discharge: HOME | End: 2023-06-06
Payer: COMMERCIAL

## 2023-06-06 PROCEDURE — 97016 VASOPNEUMATIC DEVICE THERAPY: CPT

## 2023-06-06 PROCEDURE — G0283 ELEC STIM OTHER THAN WOUND: HCPCS

## 2023-06-06 NOTE — FLOWSHEET NOTE
given.    Assessment  Continued with Vasopneumatic compression with E-stim (IFC) for pain control. No abnormal symptoms were reported upon completion of the treatment session today. Goals  STG: (to be met in 9 treatments)  Patient will report an average pain level of 3-4/10 within the (R) shoulder at rest.  Patient will demonstrate independence with his Phase I (0-6 weeks) home exercise program   LTG: (to be met in 18 treatments)  To be determined at  the progress note update/9 treatments per the physician's orders/protocol               Patient goals: To return to the (R) shoulder to full function without limitation. Plan: [x] Continue per plan of care.    [] Other:      Treatment Charges: Mins Units Time In/Out   [] Evaluation       []  Low       []  Moderate       []  High      [x]  Modalities - E-stim(IFC) 15 1 1418-6583   []  Ther Exercise      []  Neuromuscular Re-ed      []  Gait Training      []  Manual Therapy      []  Ther Activities      []  Aquatics      [x]  Vasocompression 15 1 0581-7865   []  Cervical Traction      []  Other      Total Treatment time         Vasopneumatic Compression and E-stim (IFC) were performed concurrently    Time In: 1130        Time Out: 3415    Electronically signed by:  KALYAN CuevasT

## 2023-06-19 ENCOUNTER — TELEPHONE (OUTPATIENT)
Dept: ORTHOPEDIC SURGERY | Age: 58
End: 2023-06-19

## 2023-06-19 ENCOUNTER — HOSPITAL ENCOUNTER (OUTPATIENT)
Dept: PHYSICAL THERAPY | Age: 58
Setting detail: THERAPIES SERIES
Discharge: HOME | End: 2023-06-19
Payer: COMMERCIAL

## 2023-06-19 NOTE — TELEPHONE ENCOUNTER
[facility] PT called to report a change of medical status of patient and discuss pain level, Nj would like a clinical staff member to call him back as he is with patient now. Did attempt transfer no clinical staff members were available.  Call back [phone]

## 2023-06-19 NOTE — FLOWSHEET NOTE
[facility]) [facility] & Therapy  [address]  Phone: [phone]  Fax: [phone]    Physical Therapy Daily Treatment Note    Date:  2023  Patient: Saira Kearney  : 1965  MRN: [id number]  Physician:  Jordon Carias MD          Medical Diagnosis:  (R) rotator cuff tear (M46.011)/Status post right rotator cuff repair (S74.224)           Rehab Codes: (R) shoulder pain (M25.511)  Onset date:  2023 (injury date) 2023 (surgery date)   Next Dr's appt.: 2023  PT Visit Information  PT Insurance Information: Worker's Compensation 3 x a week x 6 weeks (18 visits) (2023 - 2023)   Total # of Visits Approved: 18  Total # of Visits to Date: 8  No Show: 0  Canceled Appointment: 1    Subjective  Patient arrived today with increased symptoms throughout the (R) shoulder due to an incident that occurred yesterday between 1330 and 1400. He stated that he was putting a shirt on and heard a \"double pop\" within the (R) shoulder joint. Increased pain was noted immediately along with tenderness at the AC/superior region of the shoulder. Tenderness is still present with radicular symptoms going down to the mid humerus anteriorly and posteriorly. Sleeping is still difficult. Pain wakes him at night if the (R) shoulder gets into a retracted or protracted position. Called 's Office regarding these findings. However, his clinical staff was unavailable/phone number was provided to call me back. Treatment Charges: Mins Units   []  Modalities     []  Ther Exercise     []  Manual Therapy     []  Ther Activities     []  Aquatics     []  Neuromuscular     [] Vasocompression     [] Gait Training     [] Dry needling        [] 1 or 2 muscles        [] 3 or more muscles     []  Other     Total Treatment time       No billing charges today due to no treatment interventions provided.      Time In: 9058            Time Out:

## 2023-06-22 ENCOUNTER — HOSPITAL ENCOUNTER (OUTPATIENT)
Dept: PHYSICAL THERAPY | Age: 58
Setting detail: THERAPIES SERIES
End: 2023-06-22
Payer: COMMERCIAL

## 2023-06-22 NOTE — TELEPHONE ENCOUNTER
Called pt to schedule apt with Dr. Faisal Love. Pt was scheduled for this Friday, 6/23/23 at 11:45 am at [facility]. Address/Location provided.

## 2023-06-22 NOTE — TELEPHONE ENCOUNTER
Called Nj back to discuss potential reinjury of mutual patient Huber Wolf. Over the weekend Jada Edward felt and heard a double pop. Came into PT Monday and can barely move his arm. No tx performed at PT session on Monday. Jada Leon reports Kat Champagne is not responding well to therapy is consistently inflamed and puffy.

## 2023-06-23 ENCOUNTER — HOSPITAL ENCOUNTER (OUTPATIENT)
Dept: PHYSICAL THERAPY | Age: 58
Setting detail: THERAPIES SERIES
Discharge: HOME | End: 2023-06-23
Payer: COMMERCIAL

## 2023-06-23 ENCOUNTER — OFFICE VISIT (OUTPATIENT)
Dept: ORTHOPEDIC SURGERY | Age: 58
End: 2023-06-23

## 2023-06-23 ENCOUNTER — TELEPHONE (OUTPATIENT)
Dept: ORTHOPEDIC SURGERY | Age: 58
End: 2023-06-23

## 2023-06-23 VITALS — RESPIRATION RATE: 14 BRPM | WEIGHT: 241.1 LBS | HEIGHT: 71 IN | BODY MASS INDEX: 33.75 KG/M2

## 2023-06-23 DIAGNOSIS — Z98.890 STATUS POST RIGHT ROTATOR CUFF REPAIR: Primary | ICD-10-CM

## 2023-06-23 PROCEDURE — 97110 THERAPEUTIC EXERCISES: CPT

## 2023-06-23 PROCEDURE — 97140 MANUAL THERAPY 1/> REGIONS: CPT

## 2023-06-23 NOTE — FLOWSHEET NOTE
[address] [facility] & Therapy  [address]  Phone: [phone]  Fax: [phone]    Physical Therapy Daily Treatment Note    Date:  2023  Patient: Jessi Rinaldi  : 1965  MRN: [id number]  Physician:  Rachna Wolff MD          Medical Diagnosis:  (R) rotator cuff tear (M46.011)/Status post right rotator cuff repair (E39.195)           Rehab Codes: (R) shoulder pain (M25.511)  Onset date:  2023 (injury date) 2023 (surgery date)   Next Dr's appt.: 2023  PT Visit Information  PT Insurance Information: Worker's Compensation 3 x a week x 6 weeks (18 visits) (2023 - 2023)   Total # of Visits Approved: 18  Total # of Visits to Date: 9  No Show: 0  Canceled Appointment: 1    Subjective  Patient reports that his pain-level remains increased since his episode on . Resting pain at baseline -2/10, -7/10 with active movement. Pt to return to work next week with restrictions. Called 's Office regarding these findings. Arthrogram and MRI ordered for right shoulder--awaiting [facility] approval.  Advised to continue therapy per protocol to pt's tolerance. Information relayed by Pasc[facility] Bragg PT. Confirmed via EMR documentation of physician's visit on this date. Objective:  Passive ROM: pain at 80 degrees ABD and flexion sx localized to supraspinatus insertion and muscle belly. Scaption to 130 degrees performed with GH joint distraction, all motions painful eccentrically. Infraspinatus pain with ER (45 degrees ABD) to 30 degrees and with return to neutral.   Good GH capsular and scapular mobility without pain provocation. Sx comparable with previous sessions--higher intensity than previously.      Precautions: s/p (R) RTC repair (6 weeks post-op 2023) - Dr. Bright Jorge small to medium protocol for a rotator cuff repair  Phase IV  23:  Avoid performing active motions over shoulder height, avoid sudden/ballistic

## 2023-06-23 NOTE — TELEPHONE ENCOUNTER
Nj called back and is requesting a call from [facility]. Regarding todays appt with physical therapy.  Jeremias Skipper can be reached at [phone]

## 2023-06-23 NOTE — PROGRESS NOTES
Procedure: Right shoulder arthroscopic rotator cuff repair and biceps tenodesis  Date of procedure: 5/2/23    HPI: Jeet Marshall is a 56 y.o. male who is approximately 8 weeks status post the aforementioned procedure. That over the past weekend he was putting on his shirt and heard/felt 2 pops in his right shoulder. He has experienced some increase in pain ever since and continues to have pain lifting this arm up with therapy. Physical examination:  Resp 14   Ht 5' 11\" (1.803 m) Comment: per pt  Wt 241 lb 1.6 oz (109.4 kg)   BMI 33.63 kg/m²   Evaluation of patient's right shoulder and upper extremity demonstrates his incisions to be healed appropriately. There is minimal to no swelling at this time. Sensation is grossly intact light touch in all dermatomes and he has a 2+ radial pulse. He has approximately 110 220 degrees of shoulder elevation today which is quite painful for him. Impression and plan: Jeet Marshall is a 56 y.o. male  who is approximately 8 weeks status post right shoulder arthroscopic rotator cuff repair and biceps tenodesis. He is struggling with recovery and experienced a setback with the pops in the shoulder experience as outlined above. I had a discussion with the patient today about this the concern as always it is for infection or failure of the rotator cuff repair. He does not appear to be infected at this time clinically and so I recommended further evaluation with an MRI arthrogram of the shoulder to assess the integrity of his repair. We will facilitate him getting the study completed and I will have him follow-up afterwards to review and discuss results proceeding with additional treatment as indicated. A C9 will be completed to get authorization for the MRI study. In the meantime he is to continue on with physical therapy. He can certainly return to work with a 1 pound restriction in terms of lifting pushing or pulling with his right arm.   No at or above

## 2023-06-26 ENCOUNTER — HOSPITAL ENCOUNTER (OUTPATIENT)
Dept: PHYSICAL THERAPY | Age: 58
Setting detail: THERAPIES SERIES
Discharge: HOME | End: 2023-06-26
Payer: COMMERCIAL

## 2023-06-28 ENCOUNTER — TELEPHONE (OUTPATIENT)
Dept: ORTHOPEDIC SURGERY | Age: 58
End: 2023-06-28

## 2023-06-29 ENCOUNTER — HOSPITAL ENCOUNTER (OUTPATIENT)
Dept: PHYSICAL THERAPY | Age: 58
Setting detail: THERAPIES SERIES
Discharge: HOME | End: 2023-06-29
Payer: COMMERCIAL

## 2023-06-29 PROCEDURE — 97110 THERAPEUTIC EXERCISES: CPT

## 2023-07-03 ENCOUNTER — HOSPITAL ENCOUNTER (OUTPATIENT)
Dept: PHYSICAL THERAPY | Age: 58
Setting detail: THERAPIES SERIES
Discharge: HOME | End: 2023-07-03
Payer: COMMERCIAL

## 2023-07-03 PROCEDURE — 97110 THERAPEUTIC EXERCISES: CPT

## 2023-07-05 ENCOUNTER — TELEPHONE (OUTPATIENT)
Dept: ORTHOPEDIC SURGERY | Age: 58
End: 2023-07-05

## 2023-07-05 ENCOUNTER — HOSPITAL ENCOUNTER (OUTPATIENT)
Dept: PHYSICAL THERAPY | Age: 58
Setting detail: THERAPIES SERIES
Discharge: HOME | End: 2023-07-05
Payer: COMMERCIAL

## 2023-07-05 PROCEDURE — 97110 THERAPEUTIC EXERCISES: CPT

## 2023-07-05 NOTE — FLOWSHEET NOTE
[facility]) [facility] & Therapy  [address]  Phone: [phone]  Fax: [phone]    Physical Therapy Daily Treatment Note    Date:  2023  Patient: Cj Alves  : 1965  MRN: [id number]  Physician:  Miriam Graham MD          Medical Diagnosis: (R) rotator cuff tear (M46.011)/Status post right rotator cuff repair (Z98.890)           Rehab Codes: (R) shoulder pain (M25.511)  Onset date:2023 (injury date) 2023 (surgery date)  Next Dr's appt.: TBD  PT Visit Information  PT Insurance Information:   Total # of Visits Approved: 18  Total # of Visits to Date: 12  No Show: 0  Canceled Appointment: 2    Subjective  Patient stated that his (R) shoulder is \"a little more tender\" to the touch and with movement compared to his previous treatment session. Cause relatively unknown/no abnormal activities reported. Patient stated that he had a difficult time last night finding a comfortable position while sleeping without increasing ihjos pain. He found hanging Patient stated that was able to perform the isometric exercises/home exercise program that was provided following his last treatment session without difficulty.     Pain  Pain:  [x] Yes   [] No       Location: (R) shoulder anterior/lateral region with radicular symptoms to the mid humerus  Pain Rating: (0-10 scale) 2-3/10 with pain medication   Worst: 6-7/10 with pain medication   Best: 2-3/10 with pain medication   Descriptors: constant, dull, achy, sharp, shooting    Objective  Modalities:   Precautions: s/p (R) RTC repair (8 weeks post-op 2023)   Exercises:  Exercise Reps/ Time Weight/ Level Comments   Supine (R) shoulder protraction  20 reps      Side lying (R) shoulder ER with towel  20 reps      Prone (R) shoulder extension  20 reps      Prone (R) shoulder rows 20 reps      Seated (R) shoulder flexion  10 reps   AAROM to 90 degrees only    Seated (R) shoulder scaption  10 reps   AAROM to

## 2023-07-05 NOTE — FLOWSHEET NOTE
[facility]) [facility] & Therapy  [address]  Phone: [phone]  Fax: [phone]    Physical Therapy Daily Treatment Note    Date:  7/3/2023  Patient: Roc Elias  : 1965  MRN: [id number]  Physician:  Jo-Ann Bedolla MD          Medical Diagnosis: (R) rotator cuff tear (M46.011)/Status post right rotator cuff repair (Z98.890)           Rehab Codes: (R) shoulder pain (M25.511)  Onset date:2023 (injury date) 2023 (surgery date)  Next Dr's appt.: TBD  PT Visit Information  PT Insurance Information:   Total # of Visits Approved: 18  Total # of Visits to Date: 11  No Show: 0  Canceled Appointment: 2    Subjective  Patient stated that he feels he has improved since his last treatment session/felt his active motion(s) within his (R) shoulder have increased. However, his pain levels are relatively unchanged compared to his previous treatment session. Pain  Pain:  [x] Yes   [] No       Location: (R) shoulder anterior/lateral region with radicular symptoms to the mid humerus  Pain Rating: (0-10 scale) 2-3/10 with pain medication   Worst: 4/10 with pain medication   Best: 2-3/10 with pain medication   Descriptors: constant, dull, achy, sharp, shooting    Objective  Modalities:   Precautions: s/p (R) RTC repair (8 weeks post-op 2023)   Exercises:  Exercise Reps/ Time Weight/ Level Comments   Supine (R) shoulder protraction  20 reps      Side lying (R) shoulder ER with towel  20 reps      Prone (R) shoulder extension  20 reps                  Passive Stretching   Supine (R) shoulder flexion stretch  30 sec hold x 3 reps   Supine (R) shoulder abduction stretch 30 sec hold x 3 reps   Supine (R) shoulder ER stretch @ 45 30 sec hold x 3 reps   Supine (R) shoulder IR stretch @ 80 30 sec hold x 3 reps    Specific Instructions for next treatment: Continue to follow Dr. Angela Mortensen Rotator Cuff Repair (Small to Medium) Protocol    Pt.  Education:  [x] Yes  []

## 2023-07-05 NOTE — TELEPHONE ENCOUNTER
Nj with [facility] called to ask for Office to request authorization through [facility] for 8 additional visits -  starting 6/30.

## 2023-07-07 ENCOUNTER — HOSPITAL ENCOUNTER (OUTPATIENT)
Dept: PHYSICAL THERAPY | Age: 58
Setting detail: THERAPIES SERIES
Discharge: HOME | End: 2023-07-07
Payer: COMMERCIAL

## 2023-07-07 PROCEDURE — 97110 THERAPEUTIC EXERCISES: CPT

## 2023-07-07 NOTE — FLOWSHEET NOTE
[facility]) [facility] & Therapy  [address]  Phone: [phone]  Fax: [phone]    Physical Therapy Daily Treatment Note    Date:  2023  Patient: Cj Alves  : 1965  MRN: [id number]  Physician:  Miriam Graham MD          Medical Diagnosis: (R) rotator cuff tear (M46.011)/Status post right rotator cuff repair (Z98.890)           Rehab Codes: (R) shoulder pain (M25.511)  Onset date:2023 (injury date) 2023 (surgery date)  Next Dr's appt.: TBD  PT Visit Information  PT Insurance Information:   Total # of Visits Approved: 18  Total # of Visits to Date: 13  No Show: 0  Canceled Appointment: 2    Subjective  Patient stated that his (R) shoulder is \"a little more tender\" to the touch and with movement compared to his previous treatment session. Cause relatively unknown/no abnormal activities reported. Patient stated that he had a difficult time last night finding a comfortable position while sleeping without increasing ihjos pain. He found hanging Patient stated that was able to perform the isometric exercises/home exercise program that was provided following his last treatment session without difficulty.     Pain  Pain:  [x] Yes   [] No       Location: (R) shoulder anterior/lateral region with radicular symptoms to the mid humerus  Pain Rating: (0-10 scale) 2-3/10 with pain medication   Worst: 6-7/10 with pain medication   Best: 2-3/10 with pain medication   Descriptors: constant, dull, achy, sharp, shooting    Objective  Modalities:   Precautions: s/p (R) RTC repair (8 weeks post-op 2023)   Exercises:  Exercise Reps/ Time Weight/ Level Comments   Supine (R) shoulder protraction  20 reps      Side lying (R) shoulder ER with towel  20 reps      Prone (R) shoulder extension  20 reps      Prone (R) shoulder rows 20 reps      Seated (R) shoulder flexion  10 reps   AAROM to 90 degrees only    Seated (R) shoulder scaption  10 reps   AAROM to

## 2023-07-10 ENCOUNTER — HOSPITAL ENCOUNTER (OUTPATIENT)
Dept: PHYSICAL THERAPY | Age: 58
Setting detail: THERAPIES SERIES
Discharge: HOME | End: 2023-07-10
Payer: COMMERCIAL

## 2023-07-10 PROCEDURE — 97110 THERAPEUTIC EXERCISES: CPT

## 2023-07-10 NOTE — FLOWSHEET NOTE
[facility]) [facility] & Therapy  [address]  Phone: [phone]  Fax: [phone]    Physical Therapy Daily Treatment Note    Date: 7/10/2023   Patient: Hemant Alves  : 1965  MRN: [id number]  Physician:  Jessie Valencia MD          Medical Diagnosis: (R) rotator cuff tear (M46.011)/Status post right rotator cuff repair (Z98.890)           Rehab Codes: (R) shoulder pain (M25.511)  Onset date:2023 (injury date) 2023 (surgery date)  Next Dr's appt.: TBD  PT Visit Information  PT Insurance Information: Worker's Compensation 3 x a week x 6 weeks (18 visits) (2023 - 2023)   Total # of Visits Approved: 18  Total # of Visits to Date: 14  No Show: 0  Canceled Appointment: 2    Subjective  Patient stated that his (R) shoulder is \"hurting today\" with little to no activity involving the (R) UE. He stated that he is still having difficulty finding a comfortable position to sleep/all positions are painful at times.      Pain  Pain:  [x] Yes   [] No       Location: (R) shoulder anterior/lateral region with radicular symptoms to the mid humerus  Pain Rating: (0-10 scale) 2-3/10 with pain medication   Worst: 5/10 with pain medication   Best: 2-3/10 with pain medication   Descriptors: constant, dull, achy, sharp, shooting    Objective  Modalities:   Precautions: s/p (R) RTC repair (9 weeks post-op 2023)   Exercises:  Exercise Reps/ Time Weight/ Level Comments   Self Assisted Pulleys  5/5 minutes   Flexion/Scaption    Standing (R) shoulder IR stretch with belt  30 sec hold x 5 reps      Standing (R) shoulder IR/behind the back  10 reps      Supine (R) shoulder protraction  5 reps x 2 sets    AAROM    Side lying (R) shoulder ER with towel  5 reps x 2 sets   AAROM    Prone (R) shoulder extension  5 reps x 2 sets       Prone (R) shoulder rows 5 reps x 2 sets      Seated Supported Bicep Curl  30 reps  5 lbs     Standing Triceps Curl  30 reps  5 lbs

## 2023-07-12 ENCOUNTER — HOSPITAL ENCOUNTER (OUTPATIENT)
Dept: PHYSICAL THERAPY | Age: 58
Setting detail: THERAPIES SERIES
Discharge: HOME | End: 2023-07-12
Payer: COMMERCIAL

## 2023-07-12 PROCEDURE — 97110 THERAPEUTIC EXERCISES: CPT

## 2023-07-12 NOTE — FLOWSHEET NOTE
[facility]) [facility] & Therapy  [address]  Phone: [phone]  Fax: [phone]    Physical Therapy Daily Treatment Note    Date: 2023   Patient: Fay Kahn  : 1965  MRN: [id number]  Physician:  Lilia Shah MD          Medical Diagnosis: (R) rotator cuff tear (M46.011)/Status post right rotator cuff repair (Z98.890)           Rehab Codes: (R) shoulder pain (M25.511)  Onset date:2023 (injury date) 2023 (surgery date)  Next Dr's appt.: TBD  PT Visit Information  PT Insurance Information: Worker's Compensation 3 x a week x 6 weeks (18 visits) (2023 - 2023)   Total # of Visits Approved: 18  Total # of Visits to Date: 15  No Show: 0  Canceled Appointment: 2    Subjective  Patient stated that his symptoms are relatively unchanged compared to his previous treatment session. Sleeping is still difficult/positions of comfort are still hard to find.      Pain  Pain:  [x] Yes   [] No       Location: (R) shoulder anterior/lateral region with radicular symptoms to the mid humerus  Pain Rating: (0-10 scale) 2-3/10 with pain medication   Worst: 5/10 with pain medication   Best: 2-3/10 with pain medication   Descriptors: constant, dull, achy, sharp, shooting    Objective  Modalities:   Precautions: s/p (R) RTC repair (10 weeks post-op 2023)   Exercises:  Exercise Reps/ Time Weight/ Level Comments   Self Assisted Pulleys  5/5 minutes   Flexion/Scaption    Standing (R) shoulder IR stretch with belt  30 sec hold x 5 reps      Standing (R) shoulder IR/behind the back  10 reps      Supine (R) shoulder protraction  20 reps      Side lying (R) shoulder ER with towel  10 reps x 2 sets      Prone (R) shoulder extension  20 reps     Prone (R) shoulder rows 20 reps      Seated Supported Bicep Curl  30 reps  5 lbs     Standing Triceps Curl  30 reps  5 lbs     Seated (R) shoulder flexion  10 reps   AAROM to 90 degrees only    Seated (R) shoulder

## 2023-07-14 ENCOUNTER — HOSPITAL ENCOUNTER (OUTPATIENT)
Dept: PHYSICAL THERAPY | Age: 58
Setting detail: THERAPIES SERIES
Discharge: HOME | End: 2023-07-14
Payer: COMMERCIAL

## 2023-07-14 PROCEDURE — 97110 THERAPEUTIC EXERCISES: CPT

## 2023-07-14 NOTE — FLOWSHEET NOTE
[facility]) [facility] & Therapy  [address]  Phone: [phone]  Fax: [phone]    Physical Therapy Daily Treatment Note    Date: 2023   Patient: Alex Rm  : 1965  MRN: [id number]  Physician:  Donnie Mora MD          Medical Diagnosis: (R) rotator cuff tear (M46.011)/Status post right rotator cuff repair (Z98.890)           Rehab Codes: (R) shoulder pain (M25.511)  Onset date:2023 (injury date) 2023 (surgery date)  Next Dr's appt.: TBD  PT Visit Information  PT Insurance Information: Worker's Compensation 3 x a week x 6 weeks (18 visits) (2023 - 2023)   Total # of Visits Approved: 18  Total # of Visits to Date: 16  No Show: 0  Canceled Appointment: 2    Subjective  Patient stated that his (R) shoulder is \"super tight\"/anterior, posterior joint line and AC joint region(s)     Pain  Pain:  [x] Yes   [] No       Location: (R) shoulder anterior/lateral region with radicular symptoms to the elbow   Pain Rating: (0-10 scale) 2/10 with pain medication   Worst: 7/10 with pain medication   Best: 2/10 with pain medication   Descriptors: constant, dull, achy, sharp, shooting, annoying     Objective  Modalities:   Precautions: s/p (R) RTC repair (10 weeks post-op 2023)   Exercises:  Exercise Reps/ Time Weight/ Level Comments   Self Assisted Pulleys  5/5 minutes   Flexion/Scaption    Standing (R) shoulder IR stretch with belt  30 sec hold x 5 reps      Standing (R) shoulder IR/behind the back  10 reps      Supine (R) shoulder protraction  20 reps      Side lying (R) shoulder ER with towel  10 reps x 2 sets      Prone (R) shoulder extension  20 reps     Prone (R) shoulder rows 20 reps      Seated Supported Bicep Curl  30 reps  5 lbs     Standing Triceps Curl  30 reps  5 lbs     Seated (R) shoulder flexion  10 reps   AAROM to 90 degrees only    Seated (R) shoulder scaption  10 reps   AAROM to 90 degrees only    Seated (R)

## 2023-07-17 ENCOUNTER — HOSPITAL ENCOUNTER (OUTPATIENT)
Dept: PHYSICAL THERAPY | Age: 58
Setting detail: THERAPIES SERIES
Discharge: HOME | End: 2023-07-17
Payer: COMMERCIAL

## 2023-07-17 PROCEDURE — 97110 THERAPEUTIC EXERCISES: CPT

## 2023-07-17 NOTE — FLOWSHEET NOTE
[facility]) [facility] & Therapy  [address]  Phone: [phone]  Fax: [phone]    Physical Therapy Daily Treatment Note    Date: 2023   Patient: Antonietta Melo  : 1965  MRN: [id number]  Physician:  Chuck Bangura MD          Medical Diagnosis: (R) rotator cuff tear (M46.011)/Status post right rotator cuff repair (Z98.890)           Rehab Codes: (R) shoulder pain (M25.511)  Onset date:2023 (injury date) 2023 (surgery date)  Next Dr's appt.: TBD  PT Visit Information  PT Insurance Information: Worker's Compensation 3 x a week x 6 weeks (18 visits) (2023 - 2023)   Total # of Visits Approved: 18  Total # of Visits to Date: 17  No Show: 0  Canceled Appointment: 2    Subjective  Patient stated that his pain levels have increased in his (R) shoulder since his last treatment session. He stated that he was using a towel that got caught on a door and caused him shoulder to external rotate farther than he liked. Pain  Pain:  [x] Yes   [] No       Location: (R) shoulder anterior/posterior and AC joint region(s).    Pain Rating: (0-10 scale) 3-4/10 with pain medication   Worst: 9/10 with pain medication   Best: 3-4/10 with pain medication   Descriptors: constant, dull, achy, sharp, shooting, annoying     Objective  Modalities:   Precautions: s/p (R) RTC repair (10 weeks post-op 2023)   Exercises:  Exercise Reps/ Time Weight/ Level Comments   Self Assisted Pulleys  5/5 minutes   Flexion/Scaption    Standing (R) shoulder IR stretch with belt  30 sec hold x 5 reps      Standing (R) shoulder IR/behind the back  10 reps      Supine (R) shoulder protraction  20 reps      Side lying (R) shoulder ER with towel  10 reps x 2 sets      Prone (R) shoulder extension  20 reps     Prone (R) shoulder rows 20 reps      Seated Supported Bicep Curl  30 reps  5 lbs     Standing Triceps Curl  30 reps  5 lbs     Seated (R) shoulder flexion  10 reps

## 2023-07-21 ENCOUNTER — HOSPITAL ENCOUNTER (OUTPATIENT)
Dept: PHYSICAL THERAPY | Age: 58
Setting detail: THERAPIES SERIES
Discharge: HOME | End: 2023-07-21
Payer: COMMERCIAL

## 2023-07-21 PROCEDURE — 97110 THERAPEUTIC EXERCISES: CPT

## 2023-07-21 NOTE — PROGRESS NOTES
[address] Outpatient [facility] Therapy  [address]         Phone: [phone]       Fax: [phone]    Physical Therapy Progress Note Update/Discharge Summary    Date:  2023  Patient: Cj Alves  : 1965  MRN: [id number]  Physician: Miriam Graham MD     Medical Diagnosis: (R) rotator cuff tear (M46.011)/Status post right rotator cuff repair (C01.191)   Rehab Codes: (R) shoulder pain (M25.511)  Onset date: 2023 (injury date) 2023 (surgery date)  Next Dr's appt.: 2023  PT Visit Information  PT Insurance Information: Worker's Compensation 3 x a week x 6 weeks (18 visits) (2023 - 2023)   Total # of Visits Approved: 18  Total # of Visits to Date: 18  No Show: 0  Canceled Appointment: 2     Subjective  Patient stated that his symptoms are relatively unchanged compared to his previous treatment session. He stated that overall he feels he is not making much progress. He still having difficulty finding a comfortable position to sleep/lacking sleep. Pain  Pain:  [x] Yes   [] No       Location: (R) shoulder anterior/posterior and AC joint region(s). Pain Rating: (0-10 scale) 3-4/10 with pain medication   Worst: 9/10 with pain medication   Best: 3-4/10 with pain medication   Descriptors: constant, dull, achy, sharp, shooting, annoying  Aggravating factors: Active movement of the (R) shoulder /UE while attempting to perform his ADLs.    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

## 2023-07-24 ENCOUNTER — HOSPITAL ENCOUNTER (OUTPATIENT)
Dept: MRI IMAGING | Age: 58
Discharge: HOME | End: 2023-07-26
Attending: ORTHOPAEDIC SURGERY
Payer: COMMERCIAL

## 2023-07-24 ENCOUNTER — OFFICE VISIT (OUTPATIENT)
Dept: ORTHOPEDIC SURGERY | Age: 58
End: 2023-07-24

## 2023-07-24 VITALS — WEIGHT: 240 LBS | RESPIRATION RATE: 16 BRPM | HEIGHT: 71 IN | BODY MASS INDEX: 33.6 KG/M2

## 2023-07-24 DIAGNOSIS — Z98.890 STATUS POST RIGHT ROTATOR CUFF REPAIR: ICD-10-CM

## 2023-07-24 DIAGNOSIS — Z98.890 STATUS POST RIGHT ROTATOR CUFF REPAIR: Primary | ICD-10-CM

## 2023-07-24 PROCEDURE — 73221 MRI JOINT UPR EXTREM W/O DYE: CPT

## 2023-07-25 NOTE — PROGRESS NOTES
HPI: Mr. Sedrick Avila is here today to review the results of his right shoulder MRI study which was completed on 7/24/2023. I did review the images with the patient today and it demonstrates his repaired rotator cuff to be mostly intact. No significant tears noted. There is some surrounding edema. Anchors are in place without evidence of loosening or migration. No an abscess. I did have a discussion with the patient today about his MRI findings. There does not appear to be any significant structural damage to warrant additional surgical intervention. He is essentially 3 months out from surgery at this time. I would recommend that he continue to give this some time to heal.  To help with the pain that he continues to have I did recommend a cortisone injection today. I also encouraged him to keep up with physical therapy. C9 will be completed to obtain authorization for the injection. I will have him return after this has been obtained.

## 2023-07-31 ENCOUNTER — TELEPHONE (OUTPATIENT)
Dept: ORTHOPEDIC SURGERY | Age: 58
End: 2023-07-31

## 2023-07-31 ENCOUNTER — OFFICE VISIT (OUTPATIENT)
Dept: ORTHOPEDIC SURGERY | Age: 58
End: 2023-07-31

## 2023-07-31 VITALS — BODY MASS INDEX: 33.6 KG/M2 | HEIGHT: 71 IN | WEIGHT: 240 LBS | RESPIRATION RATE: 14 BRPM

## 2023-07-31 DIAGNOSIS — M25.511 RIGHT SHOULDER PAIN, UNSPECIFIED CHRONICITY: ICD-10-CM

## 2023-07-31 DIAGNOSIS — Z98.890 STATUS POST RIGHT ROTATOR CUFF REPAIR: Primary | ICD-10-CM

## 2023-07-31 RX ORDER — TRIAMCINOLONE ACETONIDE 40 MG/ML
40 INJECTION, SUSPENSION INTRA-ARTICULAR; INTRAMUSCULAR ONCE
Status: COMPLETED | OUTPATIENT
Start: 2023-07-31 | End: 2023-07-31

## 2023-07-31 RX ORDER — LIDOCAINE HYDROCHLORIDE 10 MG/ML
5 INJECTION, SOLUTION INFILTRATION; PERINEURAL ONCE
Status: COMPLETED | OUTPATIENT
Start: 2023-07-31 | End: 2023-07-31

## 2023-07-31 RX ADMIN — TRIAMCINOLONE ACETONIDE 40 MG: 40 INJECTION, SUSPENSION INTRA-ARTICULAR; INTRAMUSCULAR at 12:02

## 2023-07-31 RX ADMIN — LIDOCAINE HYDROCHLORIDE 5 ML: 10 INJECTION, SOLUTION INFILTRATION; PERINEURAL at 12:02

## 2023-07-31 NOTE — TELEPHONE ENCOUNTER
Called Nj at [address] [facility] and he is looking for another C-9 for patient for another 18 visit. Please see Nj's note dated 7/21/23 at the bottom:  Dr. Ralph Mendez cosigned the order for more therapy. *PLEASE SIGN ABOVE AND FAX BACK ALL PAGES*              Cosigned by: Judith Garcia MD at 7/23/2023  2:31 PM     Please fill out new C-9 request for 18 more visits.

## 2023-07-31 NOTE — PROGRESS NOTES
HPI: Mr. Katheryn Caban is approximately 3 months status post rotator cuff repair and biceps tenodesis and is here today for his right shoulder cortisone injection as previously discussed. This was approved through [employer]. He had the injection administered as outlined below. He was encouraged to keep up with physical therapy and his home exercise program.  He is to maintain his current restrictions at work as well. I will see him back for reevaluation in 6 weeks. Procedure: right shoulder subacromial space injection  Following an appropriate discussion with the patient regarding the risks and benefits of the procedure he consented to proceed. his right shoulder was prepped using chlorhexadine solution. Using aseptic technique and through a posterior approach, his right shoulder subacromial space was injected with a 4 cc mixture of 1cc 40mg/ml kenalog and 3 cc of 1% lidocaine without epinephrine. A band aid was applied to the injection site. he tolerated the injection with no immediate adverse reactions.

## 2023-07-31 NOTE — CARE COORDINATION
[facility]) [facility] & Therapy  [address]  Phone: [phone]  Fax: [phone]    Date:  23  Patient: Cindy August                   : 1965                    MRN: [id number]  Physician: Antonio Jamison MD                                 Medical Diagnosis: (R) rotator cuff tear   Status post right rotator cuff repair (Z77.148)      Rehab Codes: (R) shoulder pain (M25.511)  Onset date: 2023 (injury date) 2023 (surgery date)  Next 's appt.: 23  PT Visit Information  PT Insurance Information: Worker's Compensation 3 x a week x 6 weeks (18 visits) (2023 - 2023)   Total # of Visits Approved: 18  Total # of Visits to Date: 18  No Show: 0  Canceled Appointment: 0      Treatment notes sent to employers nurse  from 23-23    Alberto Hsihe @ [address] [place] [address] in [place]. JOSHUA Jamil

## 2023-07-31 NOTE — TELEPHONE ENCOUNTER
Lety Kirk, PT with [facility], was informed that 18 more visits were approved by Dr. Mariam Saleh. Lety Kirk asks if a C9 was done for those visits. Please call and let him know.

## 2023-08-01 ENCOUNTER — HOSPITAL ENCOUNTER (OUTPATIENT)
Dept: PHYSICAL THERAPY | Age: 58
Setting detail: THERAPIES SERIES
Discharge: HOME | End: 2023-08-01
Payer: COMMERCIAL

## 2023-08-01 PROCEDURE — 97110 THERAPEUTIC EXERCISES: CPT

## 2023-08-01 NOTE — FLOWSHEET NOTE
factors: Active movement of the (R) shoulder /UE while attempting to perform his ADLs. Relieving factors: Rest, Repositioning     Dr Orozco's Note from 7/25/23 :          \"HPI: Mr. Angus Lambert is here today to review the results of his right shoulder MRI study which was completed on 7/24/2023. I did review the images with the patient today and it demonstrates his repaired rotator cuff to be mostly intact. No significant tears noted. There is some surrounding edema. Anchors are in place without evidence of loosening or migration. No an abscess. I did have a discussion with the patient today about his MRI findings. There does not appear to be any significant structural damage to warrant additional surgical intervention. He is essentially 3 months out from surgery at this time. I would recommend that he continue to give this some time to heal.  To help with the pain that he continues to have I did recommend a cortisone injection today. I also encouraged him to keep up with physical therapy. C9 will be completed to obtain authorization for the injection. I will have him return after this has been obtained. \"             Objective  Modalities:   Precautions: s/p (R) RTC repair (13 weeks post-op 8/1/2023) ; per patient 5# lifting restriction & push pull ; Dr. Maile Mohan small to medium protocol for a rotator cuff repair Phase IV/V based on tolerance  Exercises:  Exercise Reps/ Time Weight/ Level Comments   UBE Add?      Self Assisted Pulleys  2'30\" each    Flexion/Scaption    Standing (R) shoulder IR stretch with belt  30 sec hold x 5 reps     Watch forward posture   Standing (R) shoulder IR/behind the back  10 reps        Supine (R) shoulder protraction  10 reps x 2 sets 1 lb     Side lying (R) shoulder ER with towel  10 reps x 2 sets        Prone (R) shoulder extension  20 reps       Prone (R) shoulder rows 20 reps        Seated Supported Bicep Curl  30 reps  5 lbs DB  Palm up   Standing (R) Triceps Press @ [employer]  30

## 2023-08-02 ENCOUNTER — HOSPITAL ENCOUNTER (OUTPATIENT)
Dept: PHYSICAL THERAPY | Age: 58
Setting detail: THERAPIES SERIES
Discharge: HOME | End: 2023-08-02
Payer: COMMERCIAL

## 2023-08-02 PROCEDURE — 97110 THERAPEUTIC EXERCISES: CPT

## 2023-08-02 PROCEDURE — 97140 MANUAL THERAPY 1/> REGIONS: CPT

## 2023-08-02 NOTE — FLOWSHEET NOTE
[address] Outpatient Physical Therapy  [address]         Phone: [phone]       Fax: [phone]    Physical Therapy Daily Note    Date:  2023  Patient: Cena Sandhoff  : 1965  MRN: [id number]  Physician: Chetna Sterling MD     Medical Diagnosis: (R) rotator cuff tear (M46.011)/Status post right rotator cuff repair (S38.051)   Rehab Codes: (R) shoulder pain (M25.511)  Onset date: 2023 (injury date) 2023 (surgery date)  Next Dr's appt.: 23    PT Visit Information  PT Insurance Information: Worker's Compensation: Current Auth Pending: 3 x a week x 6 weeks (18 visits) (dates pending on Dr Cosby [place]; Verbal okay from Laura Pandya RN to continue PT)    Initial Auth 3 x a week x 6 weeks (18 visits) (2023 - 2023)     Total # of Visits Approved: 36  Total # of Visits to Date: 20  No Show: 0  Canceled Appointment: 2     Subjective  Patient reporting to therapy presenting with more fwd head, very guarded in shoulder and R side of UT. Patient reporting he has had steady pain in his neck and shoulder all day today. Pain  Pain:  [x] Yes   [] No       Location: (R) shoulder anterior and Superior/AC joint region   Pain Rating: (0-10 scale) 3-4/10     Descriptors: constant, dull, achy, sharp, shooting, annoying  Aggravating factors: Active movement of the (R) shoulder /UE while attempting to perform his ADLs. Relieving factors: Rest, Repositioning     Dr Orozco's Note from 23 :          \"HPI: Mr. Dinah Mcrae is here today to review the results of his right shoulder MRI study which was completed on 2023. I did review the images with the patient today and it demonstrates his repaired rotator cuff to be mostly intact. No significant tears noted. There is some surrounding edema. Anchors are in place without evidence of loosening or migration. No an abscess.   I did have a discussion with the patient today about his MRI

## 2023-08-04 ENCOUNTER — HOSPITAL ENCOUNTER (OUTPATIENT)
Dept: PHYSICAL THERAPY | Age: 58
Setting detail: THERAPIES SERIES
Discharge: HOME | End: 2023-08-04
Payer: COMMERCIAL

## 2023-08-04 PROCEDURE — 97140 MANUAL THERAPY 1/> REGIONS: CPT

## 2023-08-04 PROCEDURE — 97110 THERAPEUTIC EXERCISES: CPT

## 2023-08-04 NOTE — FLOWSHEET NOTE
[address] Outpatient Physical Therapy  [address]         Phone: [phone]       Fax: [phone]    Physical Therapy Daily Note    Date:  2023  Patient: Yamini Young  : 1965  MRN: [id number]  Physician: Sandra Rodas MD     Medical Diagnosis: (R) rotator cuff tear (M46.011)/Status post right rotator cuff repair (C62.520)   Rehab Codes: (R) shoulder pain (M25.511)  Onset date: 2023 (injury date) 2023 (surgery date)  Next Dr's appt.: 23    PT Visit Information  PT Insurance Information: Worker's Compensation: Current Auth Pending: 3 x a week x 6 weeks (18 visits) (dates pending on Dr Cosby [employer]; Verbal okay from Catrina Fowler RN to continue PT)    Initial Auth 3 x a week x 6 weeks (18 visits) (2023 - 2023)     Total # of Visits Approved: 36  Total # of Visits to Date: 21  No Show: 0  Canceled Appointment: 2     Subjective: Pt. Reports his pain is much better after his last appointment in his shoulder. States he continues to get stiffness in his neck (across.)        Pain  Pain:  [x] Yes   [] No       Location: (R) shoulder anterior and Superior/AC joint region   Pain Rating: (0-10 scale) 3-4/10     Descriptors: constant, dull, achy, sharp, shooting, annoying  Aggravating factors: Active movement of the (R) shoulder /UE while attempting to perform his ADLs. Relieving factors: Rest, Repositioning     Dr Orozco's Note from 23 :          \"HPI: Mr. Kevin Huber is here today to review the results of his right shoulder MRI study which was completed on 2023. I did review the images with the patient today and it demonstrates his repaired rotator cuff to be mostly intact. No significant tears noted. There is some surrounding edema. Anchors are in place without evidence of loosening or migration. No an abscess. I did have a discussion with the patient today about his MRI findings.   There does not appear to be any

## 2023-08-07 ENCOUNTER — APPOINTMENT (OUTPATIENT)
Dept: PHYSICAL THERAPY | Age: 58
End: 2023-08-07
Payer: COMMERCIAL

## 2023-08-08 ENCOUNTER — HOSPITAL ENCOUNTER (OUTPATIENT)
Dept: PHYSICAL THERAPY | Age: 58
Setting detail: THERAPIES SERIES
Discharge: HOME | End: 2023-08-08
Payer: COMMERCIAL

## 2023-08-08 PROCEDURE — 97140 MANUAL THERAPY 1/> REGIONS: CPT

## 2023-08-08 PROCEDURE — 97110 THERAPEUTIC EXERCISES: CPT

## 2023-08-08 NOTE — FLOWSHEET NOTE
[address] Outpatient Physical Therapy  [address]         Phone: [phone]       Fax: [phone]    Physical Therapy Daily Note    Date:  2023  Patient: Catarino Sousa  : 1965  MRN: [id number]  Physician: Luis Bustos MD     Medical Diagnosis: (R) rotator cuff tear (M46.011)/Status post right rotator cuff repair (E24.745)   Rehab Codes: (R) shoulder pain (M25.511)  Onset date: 2023 (injury date) 2023 (surgery date)  Next Dr's appt.: 23    PT Visit Information  PT Insurance Information: Worker's Compensation: Current Auth Pending: 3 x a week x 6 weeks (18 visits) (dates pending on Dr Cosby [employer]; Verbal okay from Traci sEparza RN to continue PT)    Initial Auth 3 x a week x 6 weeks (18 visits) (2023 - 2023)     Total # of Visits Approved: 36  Total # of Visits to Date: 22  No Show: 0  Canceled Appointment: 2     Subjective: Patient reporting overall improved pain and states he was able to sleep on his R shoulder for a short amount. Pain  Pain:  [x] Yes   [] No       Location: (R) shoulder anterior and Superior/AC joint region   Pain Rating: (0-10 scale) 2-3/10     Descriptors: constant, dull, achy, sharp, shooting, annoying  Aggravating factors: Active movement of the (R) shoulder /UE while attempting to perform his ADLs. Relieving factors: Rest, Repositioning     Dr Orozco's Note from 23 :          \"HPI: Mr. Eliza Olivera is here today to review the results of his right shoulder MRI study which was completed on 2023. I did review the images with the patient today and it demonstrates his repaired rotator cuff to be mostly intact. No significant tears noted. There is some surrounding edema. Anchors are in place without evidence of loosening or migration. No an abscess. I did have a discussion with the patient today about his MRI findings.   There does not appear to be any significant structural damage to

## 2023-08-10 ENCOUNTER — HOSPITAL ENCOUNTER (OUTPATIENT)
Dept: PHYSICAL THERAPY | Age: 58
Setting detail: THERAPIES SERIES
Discharge: HOME | End: 2023-08-10
Payer: COMMERCIAL

## 2023-08-10 PROCEDURE — 97110 THERAPEUTIC EXERCISES: CPT

## 2023-08-10 NOTE — FLOWSHEET NOTE
(R) shoulder flexion 30 sec hold x 3 reps  Supine (R) shoulder abduction 30 sec hold x 3 reps  Supine (R) shoulder ER stretch @ 90/90 30 sec hold x 3 reps  Supine (R) shoulder IR stretch @ 90/90 30 sec hold x 3 reps     Pt. Education:  [] Yes  [x] No  [] Reviewed Prior HEP/Ed  Method of Education: [] Verbal  [] Demo  [] Written  HEP:   Comprehension of Education:  [] Verbalizes understanding. [] Demonstrates understanding. [] Needs review. [] Demonstrates/verbalizes HEP/Ed previously given. Assessment  Continued with exercise focusing on the lack of motion(s) that the patient still has at 14 weeks post-op. Patient was able to obtain full (R) shoulder abduction and ER @ 90/90 passively. Limited motion and tightness is still apparent with (R) shoulder flexion and IR at 90/90. Active exercises were continued against gravity. Good motor control was shown with each of them. Goals  STG: (to be met in 27 treatments)  Patient will report an average pain level of 1-2/10 within the (R) shoulder at rest/performing his ADLs. Patient will demonstrate improved PROM within all involved planes of the (R) shoulder to assist with (I) function without limitation. LTG: (to be met in 36 treatments)  No complaints of pain will be reported within the (R) shoulder at rest/with ADLs. Patient will demonstrate full PROM within all involved planes of the (R) shoulder to assist with (I) function. Quick DASH improved by 10 points. Patient demonstrate 5/5 MMT within all planes of the (R) GH joint to assist with (I) function without limitation. Patient will demonstrate independence with his home exercise program at discharge. Patient goals: To return to the (R) shoulder to full function without limitation.      Plan: [x] Continue per plan of care/Dr. Orozco's Small to Medium RTC Protocol   [] Other:      Treatment Charges: Mins Units Time In/Out   [] Evaluation       []  Low       []  Moderate       []  High      []

## 2023-08-11 ENCOUNTER — APPOINTMENT (OUTPATIENT)
Dept: PHYSICAL THERAPY | Age: 58
End: 2023-08-11
Payer: COMMERCIAL

## 2023-08-14 ENCOUNTER — HOSPITAL ENCOUNTER (OUTPATIENT)
Dept: PHYSICAL THERAPY | Age: 58
Setting detail: THERAPIES SERIES
Discharge: HOME | End: 2023-08-14
Payer: COMMERCIAL

## 2023-08-14 PROCEDURE — 97110 THERAPEUTIC EXERCISES: CPT

## 2023-08-16 ENCOUNTER — HOSPITAL ENCOUNTER (OUTPATIENT)
Dept: PHYSICAL THERAPY | Age: 58
Setting detail: THERAPIES SERIES
Discharge: HOME | End: 2023-08-16
Payer: COMMERCIAL

## 2023-08-16 PROCEDURE — 97110 THERAPEUTIC EXERCISES: CPT

## 2023-08-16 NOTE — FLOWSHEET NOTE
[facility]) [facility] & Therapy  [address]  Phone: [phone]  Fax: [phone]    Physical Therapy Daily Treatment Note    Date:  2023  Patient: Haley Gabreil  : 1965  MRN: [id number]  Physician: Etienne Hay MD                                 Medical Diagnosis: (R) rotator cuff tear (M46.011)/Status post right rotator cuff repair (Z98.890)         Rehab Codes: (R) shoulder pain (M25.511)  Onset date: 2023 (injury date) 2023 (surgery date)  Next Dr's appt.: 23     PT Visit Information  PT Insurance Information: Worker's Compensation: Current Auth Pending: 3 x a week x 6 weeks (18 visits) (dates pending on Dr Cosby [employer]; Verbal okay from Kim Ochoa RN to continue PT)     Initial Auth 3 x a week x 6 weeks (18 visits) (2023 - 2023)      Total # of Visits Approved: 36  Total # of Visits to Date: 25  No Show: 0  Canceled Appointment: 2    Subjective  Patient reporting to therapy stating he hasn't done much today as he just got up as he has to work 3rd shift tonight. Increased tenderness noted in the anterior shoulder.     Pain:  [x] Yes   [] No      Location:(R) shoulder anterior/superior/AC joint region   Over 24 hours   Pain Ratin/10  Worst: 3/10  Best: 2/10  Descriptors: constant, dull, achy, sharp, shooting, annoying  Other:     Objective  Modalities:   Precautions: Precautions: s/p (R) RTC repair (15 weeks post-op 2023) ; per patient 5# lifting restriction & push pull ; Dr. Elkin Huber small to medium protocol for a rotator cuff repair Phase IV/V based on tolerance  Exercises:  Exercise Reps/ Time Weight/ Level Comments   UBE 3/3  Forward/Retro   Pulleys - Self Assisted  3/3   Elevation/Scaption   Supine (R) shoulder protraction  30 reps      Prone (R) shoulder extension  20 reps      Prone (R) shoulder rows  20 reps      Side lying (R) shoulder ER with towel 30 reps      Seated Scapular Retraction

## 2023-08-18 ENCOUNTER — HOSPITAL ENCOUNTER (OUTPATIENT)
Dept: PHYSICAL THERAPY | Age: 58
Setting detail: THERAPIES SERIES
Discharge: HOME | End: 2023-08-18
Payer: COMMERCIAL

## 2023-08-18 PROCEDURE — 97110 THERAPEUTIC EXERCISES: CPT

## 2023-08-18 NOTE — FLOWSHEET NOTE
[facility]) [facility] & Therapy  [address]  Phone: [phone]  Fax: [phone]    Physical Therapy Daily Treatment Note    Date:  2023  Patient: Fay Kahn  : 1965  MRN: [id number]  Physician: Lilia Shah MD                                 Medical Diagnosis: (R) rotator cuff tear (M46.011)/Status post right rotator cuff repair (Z98.890)         Rehab Codes: (R) shoulder pain (M25.511)  Onset date: 2023 (injury date) 2023 (surgery date)  Next Dr's appt.: 23     PT Visit Information  PT Insurance Information: Worker's Compensation: Current Auth Pending: 3 x a week x 6 weeks (18 visits) (dates pending on Dr Cosby [facility]"; Verbal okay from Sharon Woodruff RN to continue PT)     Initial Auth 3 x a week x 6 weeks (18 visits) (2023 - 2023)      Total # of Visits Approved: 36  Total # of Visits to Date: 26  No Show: 0  Canceled Appointment: 2    Subjective  Patient reports that R shoulder feels \"fair to good\" today. Reported that he has no pain at rest, and only minimal pain with certain motions.      Pain:  [x] Yes   [] No      Location:(R) shoulder anterior/superior/AC joint region   Over 24 hours   Pain Ratin/10  Worst: 4-6/18, with certain motions  Best: 0/10  Descriptors: constant, dull, achy, sharp, shooting, annoying  Other:     Objective  Modalities:   Precautions: Precautions: s/p (R) RTC repair (15 weeks post-op 2023) ; per patient 5# lifting restriction & push pull ; Dr. Vimal De Leon small to medium protocol for a rotator cuff repair Phase IV/V based on tolerance  Exercises:  Exercise Reps/ Time Weight/ Level Comments   UBE 2.5/2.5 L3 Forward/Retro   Pulleys - Self Assisted  3/3   Elevation/Scaption   Supine (R) shoulder protraction  30 reps  2#    Supine (R) shoulder flexion 10 reps x 2  2#    Prone (R) shoulder extension  20 reps      Prone (R) shoulder rows  20 reps      Prone (R) shoulder scaption 10

## 2023-08-21 ENCOUNTER — HOSPITAL ENCOUNTER (OUTPATIENT)
Dept: PHYSICAL THERAPY | Age: 58
Setting detail: THERAPIES SERIES
Discharge: HOME | End: 2023-08-21
Payer: COMMERCIAL

## 2023-08-21 PROCEDURE — 97140 MANUAL THERAPY 1/> REGIONS: CPT

## 2023-08-21 PROCEDURE — 97110 THERAPEUTIC EXERCISES: CPT

## 2023-08-21 NOTE — FLOWSHEET NOTE
[facility]) [facility] & Therapy  [address]  Phone: [phone]  Fax: [phone]    Physical Therapy Daily Treatment Note    Date:  2023  Patient: aFy Kahn  : 1965  MRN: [id number]  Physician: Lilia Shah MD                                 Medical Diagnosis: (R) rotator cuff tear (M46.011)/Status post right rotator cuff repair (Z98.890)         Rehab Codes: (R) shoulder pain (M25.511)  Onset date: 2023 (injury date) 2023 (surgery date)  Next Dr's appt.: 23     PT Visit Information  PT Insurance Information: Worker's Compensation: Current Auth Pending: 3 x a week x 6 weeks (18 visits) (dates pending on Dr Cosby [employer]; Verbal okay from Sharon Woodruff RN to continue PT)     Initial Auth 3 x a week x 6 weeks (18 visits) (2023 - 2023)      Total # of Visits Approved: 36  Total # of Visits to Date: 27  No Show: 0  Canceled Appointment: 2    Subjective  Patient reports that R shoulder feels okay. States he still has pain/tightness in his anterior R shoulder.      Pain:  [x] Yes   [] No      Location:(R) shoulder anterior/superior/AC joint region   Over 24 hours   Pain Ratin/10  Worst: 6-4/79, with certain motions  Best: 0/10  Descriptors: constant, dull, achy, sharp, shooting, annoying  Other:     Objective  Modalities:   Precautions: Precautions: s/p (R) RTC repair (15 weeks post-op 8/15/2023) ; per patient 5# lifting restriction & push pull ; Dr. Vimal De Leon small to medium protocol for a rotator cuff repair Phase IV/V based on tolerance  Exercises:  Exercise Reps/ Time Weight/ Level Comments   UBE 2.5/2.5 L3 Forward/Retro   Pulleys - Self Assisted  3/3   Elevation/Scaption   Supine (R) shoulder protraction  30 reps  2#    Supine (R) shoulder flexion 10 reps x 2  0#  Increased pain with weight this day   Prone (R) shoulder extension  20 reps      Prone (R) shoulder rows  20 reps      Prone (R) shoulder

## 2023-08-23 ENCOUNTER — HOSPITAL ENCOUNTER (OUTPATIENT)
Dept: PHYSICAL THERAPY | Age: 58
Setting detail: THERAPIES SERIES
Discharge: HOME | End: 2023-08-23
Payer: COMMERCIAL

## 2023-08-23 PROCEDURE — 97110 THERAPEUTIC EXERCISES: CPT

## 2023-08-23 NOTE — FLOWSHEET NOTE
rows  20 reps      AAROM (R) shoulder flexion 5 reps x 2 sets     AAROM (R) shoulder abduction 5 reps x 2 sets  Min/mod assistance with eccentric control    Prone (R) shoulder horizontal abduction 15 reps x 1 set,  10 reps  X 1 set     Side lying (R) shoulder ER with towel 30 reps 1 set, 20 reps 1 set     Side lying (R) shoulder abduction 30 reps     Seated Scapular Retraction with ER  20 reps      Standing Body Blade  30 sec x 5 reps   ER/IR @ Neutral   Flexion/Extension @ Neutral    Seated (R) Bicep Curl  30 reps x 2 sets 5 lbs     Standing (R) Triceps Curl- cable column  5 lbs     Wall push ups  10 reps     Rhythmic stabilization ball to wall 10 reps  Flexion/Extension  Lateral   Standing Shoulder Shrugs  10 lbs        Pt. Education:  [] Yes  [x] No  [] Reviewed Prior HEP/Ed  Method of Education: [] Verbal  [] Demo  [] Written  HEP:   Comprehension of Education:  [] Verbalizes understanding. [] Demonstrates understanding. [] Needs review. [] Demonstrates/verbalizes HEP/Ed previously given. Assessment  Continued to work on R shoulder strengthening and ROM with exercises per chart. Added wall pushups, rhythmic stabilization, shoulder shrugs to further strengthen musculature of (R) shoulder. Notable fatigue continued following exercises with patient reporting pain/soreness with resisted flexion. Goals  STG: (to be met in 27 treatments)  Patient will report an average pain level of 1-2/10 within the (R) shoulder at rest/performing his ADLs. Patient will demonstrate improved PROM within all involved planes of the (R) shoulder to assist with (I) function without limitation. LTG: (to be met in 36 treatments)  No complaints of pain will be reported within the (R) shoulder at rest/with ADLs. Patient will demonstrate full PROM within all involved planes of the (R) shoulder to assist with (I) function. Quick DASH improved by 10 points.   Patient demonstrate 5/5 MMT within all planes of the (R) [address] joint to

## 2023-08-25 ENCOUNTER — HOSPITAL ENCOUNTER (OUTPATIENT)
Dept: PHYSICAL THERAPY | Age: 58
Setting detail: THERAPIES SERIES
Discharge: HOME | End: 2023-08-25
Payer: COMMERCIAL

## 2023-08-25 PROCEDURE — 97110 THERAPEUTIC EXERCISES: CPT

## 2023-08-25 NOTE — FLOWSHEET NOTE
abduction 5 reps x 2 sets  Min/mod assistance with eccentric control    Prone (R) shoulder horizontal abduction 15 reps x 2 set       Side lying (R) shoulder ER with towel 30 reps 1 set, 20 reps 1 set     Side lying (R) shoulder abduction 30 reps     Seated Scapular Retraction with ER  20 reps      Standing Body Blade  30 sec x 5 reps   ER/IR @ Neutral   Flexion/Extension @ Neutral    Seated (R) Bicep Curl  30 reps x 2 sets 5 lbs     Standing (R) Triceps Curl- cable column  5 lbs     Wall push ups  15 reps     Rhythmic stabilization ball to wall- positioned in shoulder ABD 10 reps 4lb ball Flexion/Extension  Lateral   Standing Shoulder Shrugs 20 reps 10 lbs        Pt. Education:  [] Yes  [x] No  [] Reviewed Prior HEP/Ed  Method of Education: [] Verbal  [] Demo  [] Written  HEP:   Comprehension of Education:  [] Verbalizes understanding. [] Demonstrates understanding. [] Needs review. [] Demonstrates/verbalizes HEP/Ed previously given. Assessment  Continued with exercises as charted above. Ran out of time at the end of the session and was not able to complete the un bolded exercises. The patient's shoulder continues to fatigue easily with exercises with most pain noted in the anterior shoulder with R shoulder elevation. Goals  STG: (to be met in 27 treatments)  Patient will report an average pain level of 1-2/10 within the (R) shoulder at rest/performing his ADLs. Patient will demonstrate improved PROM within all involved planes of the (R) shoulder to assist with (I) function without limitation. LTG: (to be met in 36 treatments)  No complaints of pain will be reported within the (R) shoulder at rest/with ADLs. Patient will demonstrate full PROM within all involved planes of the (R) shoulder to assist with (I) function. Quick DASH improved by 10 points. Patient demonstrate 5/5 MMT within all planes of the (R) GH joint to assist with (I) function without limitation.    Patient will demonstrate

## 2023-08-28 ENCOUNTER — HOSPITAL ENCOUNTER (OUTPATIENT)
Dept: PHYSICAL THERAPY | Age: 58
Setting detail: THERAPIES SERIES
Discharge: HOME | End: 2023-08-28
Payer: COMMERCIAL

## 2023-08-28 PROCEDURE — 97110 THERAPEUTIC EXERCISES: CPT

## 2023-08-28 NOTE — FLOWSHEET NOTE
Date:       Thank you for your referral                    Electronically signed by: Jia Jiang, [address] [facility]  [address]  P: [phone]   F: [phone]     Treatment Charges: Mins Units Time In/Out   [] Evaluation       []  Low       []  Moderate       []  High         []  Modalities         [x]  Ther Exercise 45 3 6924-2264   []  Neuromuscular Re-ed         []  Gait Training         []  Manual Therapy         []  Ther Activities         []  Aquatics         []  Vasocompression         []  Cervical Traction         [x]  Other: Re-Evaluation  10 0     Total Treatment time 55 3         Time In: 1430             Time Out:  5732

## 2023-08-30 ENCOUNTER — HOSPITAL ENCOUNTER (OUTPATIENT)
Dept: PHYSICAL THERAPY | Age: 58
Setting detail: THERAPIES SERIES
Discharge: HOME | End: 2023-08-30
Payer: COMMERCIAL

## 2023-08-30 PROCEDURE — 97110 THERAPEUTIC EXERCISES: CPT

## 2023-08-30 NOTE — FLOWSHEET NOTE
[facility]) - [facility] & Therapy  [address]  Phone: [phone]  Fax: [phone]    Physical Therapy Daily Treatment Note    Date:  2023  Patient: Nara Romano  : 1965  MRN: [id number]  Physician: Heri Costello MD                                 Medical Diagnosis: (R) rotator cuff tear (M46.011)/Status post right rotator cuff repair (Z98.890)         Rehab Codes: (R) shoulder pain (M25.511)  Onset date: 2023 (injury date) 2023 (surgery date)  Next Dr's appt.: 23     PT Visit Information  PT Insurance Information: Worker's Compensation: Current Auth Pending: 3 x a week x 6 weeks (18 visits) (dates pending on Dr Cosby [place]; Verbal okay from Vladislav Wheat RN to continue PT)     Initial Auth 3 x a week x 6 weeks (18 visits) (2023 - 2023)      Total # of Visits Approved: 36  Total # of Visits to Date: 31  No Show: 0  Canceled Appointment: 2    Subjective  Patient continues to report pain in the (R) anterior shoulder with mobility but states his tolerance to activity improves everyday. Patient denies pain coming into therapy session.       Pain:  [x] Yes   [] No      Location:(R) shoulder anterior/superior/AC joint region   Over 24 hours   Pain Ratin/10  Worst: 5-4/77, with certain motions  Best: 0/10  Descriptors: constant, dull, achy, sharp, shooting, annoying  Other:     Objective  Modalities:   Precautions: s/p (R) RTC repair (16 weeks post-op 2023) ; per patient 5# lifting restriction & push pull ; Dr. Tish Diego small to medium protocol for a rotator cuff repair Phase V based on tolerance  Exercises:  Exercise Reps/ Time Weight/ Level Comments   UBE 2.5/2.5 L3 Forward/Retro   Pulleys - Self Assisted  3/3   Elevation/Scaption   Prone (R) shoulder extension  20 reps      Prone (R) shoulder rows  20 reps      Prone (R) shoulder horizontal abduction 15 reps x 2 set       Standing (R) Triceps Curl- cable

## 2023-09-01 ENCOUNTER — HOSPITAL ENCOUNTER (OUTPATIENT)
Dept: PHYSICAL THERAPY | Age: 58
Setting detail: THERAPIES SERIES
Discharge: HOME | End: 2023-09-01
Payer: COMMERCIAL

## 2023-09-01 PROCEDURE — 97110 THERAPEUTIC EXERCISES: CPT

## 2023-09-01 NOTE — FLOWSHEET NOTE
with (I) function without limitation. Patient will demonstrate independence with his home exercise program at discharge. Patient goals: To return to the (R) shoulder to full function without limitation.      Plan: [x] Continue per plan of care/Dr. Orozco's Small to Medium RTC Protocol   [] Other:      Treatment Charges: Mins Units Time In/Out   [] Evaluation       []  Low       []  Moderate       []  High      []  Modalities        [x]  Ther Exercise 46 3 3182-2580   []  Neuromuscular Re-ed      []  Gait Training      []  Manual Therapy      []  Ther Activities      []  Aquatics      []  Vasocompression      []  Cervical Traction      []  Other      Total Treatment time 46 3        Time In: 1221 Time Out:  5070    Electronically signed by:  Aline Samuels, SPT

## 2023-09-06 ENCOUNTER — HOSPITAL ENCOUNTER (OUTPATIENT)
Dept: PHYSICAL THERAPY | Age: 58
Setting detail: THERAPIES SERIES
Discharge: HOME | End: 2023-09-06
Payer: COMMERCIAL

## 2023-09-06 PROCEDURE — 97110 THERAPEUTIC EXERCISES: CPT

## 2023-09-06 NOTE — FLOWSHEET NOTE
[facility] & Therapy  [address]  Phone: [phone]  Fax: [phone]    Physical Therapy Daily Treatment Note    Date:  2023  Patient: Camryn Abernathy  : 1965  MRN: [id number]  Physician: Florian Moser MD                                 Medical Diagnosis: (R) rotator cuff tear (M46.011)/Status post right rotator cuff repair (Z98.890)         Rehab Codes: (R) shoulder pain (M25.511)  Onset date: 2023 (injury date) 2023 (surgery date)  Next Dr's appt.: 23     PT Visit Information  PT Insurance Information: Worker's Compensation: Current Auth Pending: 3 x a week x 6 weeks (18 visits) (dates pending on Dr Cosby [employer]; Verbal okay from Festus Lomax RN to continue PT)     Initial Auth 3 x a week x 6 weeks (18 visits) (2023 - 2023)      Total # of Visits Approved: 36  Total # of Visits to Date: 33  No Show: 0  Canceled Appointment: 2    Subjective  Patient reports a 1/10 pain in the (R) anterior shoulder upon arrival. He states that he does not experience much pain now and feels 98% better than he did 4 weeks ago. However, he continues to have increased pain when performing aggravating movements such as moving his body pillow across his body when trying to get out of bed. He reports some fatigue on the (R) shoulder for a few hours after his previous session, however, the fatigue dissipated by the next day. Patient is seeing his physician on 2023 for a follow up on the (R) shoulder.      Pain:  [x] Yes   [] No      Location:(R) shoulder anterior/superior/AC joint region   Over 24 hours   Pain Ratin/10  Worst: 7-4/99, with certain motions  Best: 0/10  Descriptors: intermittent, dull, achy, sharp, shooting, annoying  Other:     Objective  Modalities:   Precautions: s/p (R) RTC repair (17 weeks post-op 2023) ; per patient 5# lifting restriction & push pull ; Dr. Pancho Pereira small to medium protocol for

## 2023-09-11 ENCOUNTER — OFFICE VISIT (OUTPATIENT)
Dept: ORTHOPEDIC SURGERY | Age: 58
End: 2023-09-11

## 2023-09-11 ENCOUNTER — HOSPITAL ENCOUNTER (OUTPATIENT)
Dept: PHYSICAL THERAPY | Age: 58
Setting detail: THERAPIES SERIES
Discharge: HOME | End: 2023-09-11
Payer: COMMERCIAL

## 2023-09-11 VITALS — BODY MASS INDEX: 32.2 KG/M2 | HEIGHT: 71 IN | WEIGHT: 230 LBS | RESPIRATION RATE: 14 BRPM

## 2023-09-11 DIAGNOSIS — Z98.890 STATUS POST RIGHT ROTATOR CUFF REPAIR: Primary | ICD-10-CM

## 2023-09-11 PROCEDURE — 97110 THERAPEUTIC EXERCISES: CPT

## 2023-09-11 NOTE — FLOWSHEET NOTE
[facility]) [facility] & Therapy  [address]  Phone: [phone]  Fax: [phone]    Physical Therapy Daily Treatment Note    Date:  2023  Patient: Efren Lee  : 1965  MRN: [id number]  Physician: Nusrat Proctor MD                                 Medical Diagnosis: (R) rotator cuff tear (M46.011)/Status post right rotator cuff repair (Z98.890)         Rehab Codes: (R) shoulder pain (M25.511)  Onset date: 2023 (injury date) 2023 (surgery date)  Next Dr's appt.: 23     PT Visit Information  PT Insurance Information: Worker's Compensation: Current Auth Pending: 3 x a week x 6 weeks (18 visits) (dates pending on Dr Cosby RxEye; Verbal okay from Antwon Shipley RN to continue PT)     Initial Auth 3 x a week x 6 weeks (18 visits) (2023 - 2023)      Total # of Visits Approved: 36  Total # of Visits to Date: 34  No Show: 0  Canceled Appointment: 2    Subjective  Patient stated that he followed up with surgeon since his last treatment session. Advised to continue with his physical therapy treatment sessions.      Pain:  [x] Yes   [] No      Location:(R) shoulder anterior/superior/AC joint region   Over 24 hours   Pain Ratin/10  Worst: 7/60, with certain motions  Best: 0/10  Descriptors: intermittent, dull, achy, sharp, shooting, annoying  Other:     Objective  Modalities:   Precautions: s/p (R) RTC repair (18 weeks post-op 2023) ; per patient 5# lifting restriction & push pull ; Dr. Morin Folds small to medium protocol for a rotator cuff repair Phase V based on tolerance  Exercises:  Exercise Reps/ Time Weight/ Level Comments   UBE 2.5/2.5 L3 Forward/Retro   Pulleys - Self Assisted  3/3   Elevation/Scaption   Prone (R) shoulder extension  20 reps      Prone (R) shoulder rows  20 reps      Prone (R) shoulder horizontal abduction 15 reps x 2 set       Side lying (R) shoulder ER with towel 30 reps x 2 sets     Standing

## 2023-09-13 ENCOUNTER — TELEPHONE (OUTPATIENT)
Dept: ORTHOPEDIC SURGERY | Age: 58
End: 2023-09-13

## 2023-09-13 NOTE — TELEPHONE ENCOUNTER
You saw this patient on 9/11 but it doesn't look like you addressed his work. He was on 1lb restrictions. What would you like to do with him at this point?

## 2023-09-13 NOTE — CARE COORDINATION
[facility] - [facility] & Therapy  [address]  Phone: [phone]  Fax: [phone]    Date:  23  Patient: Belinda Ibarra                   : 1965                    MRN: [id number]  Physician: Ashlyn Gaines MD                                 Medical Diagnosis: (R) rotator cuff tear   Status post right rotator cuff repair (P57.261)      Rehab Codes: (R) shoulder pain (M25.511)  Onset date: 2023 (injury date) 2023 (surgery date)  Next Dr's appt.: 23        Treatment notes sent to employers nurse  from 23-23    Kaur Felder @ [address] [place] [address] in [place]. JOSHUA Jamil

## 2023-09-15 ENCOUNTER — HOSPITAL ENCOUNTER (OUTPATIENT)
Dept: PHYSICAL THERAPY | Age: 58
Setting detail: THERAPIES SERIES
Discharge: HOME | End: 2023-09-15
Payer: COMMERCIAL

## 2023-09-15 PROCEDURE — 97110 THERAPEUTIC EXERCISES: CPT

## 2023-09-15 NOTE — FLOWSHEET NOTE
[facility]) [facility] & Therapy  [address]  Phone: [phone]  Fax: [phone]    Physical Therapy Daily Treatment Note    Date:  9/15/2023  Patient: Suresh Alejandro  : 1965  MRN: [id number]  Physician: Mack Nobles MD                                 Medical Diagnosis: (R) rotator cuff tear (M46.011)/Status post right rotator cuff repair (Z98.890)         Rehab Codes: (R) shoulder pain (M25.511)  Onset date: 2023 (injury date) 2023 (surgery date)  Next Dr's appt.: 23     PT Visit Information  PT Insurance Information: Worker's Compensation: Current Auth Pending: 3 x a week x 6 weeks (18 visits) (dates pending on Dr Cosby GdeSlon; Verbal okay from Toro Aguilar RN to continue PT)     Initial Auth 3 x a week x 6 weeks (18 visits) (2023 - 2023)      Total # of Visits Approved: 36  Total # of Visits to Date: 35  No Show: 0  Canceled Appointment: 2    Subjective  Patient stated that his symptoms are relatively unchanged from his previous treatment session.      Pain:  [x] Yes   [] No      Location:(R) shoulder anterior/superior/AC joint region   Over 24 hours   Pain Ratin/10  Worst: 3/23, with certain motions  Best: 0/10  Descriptors: intermittent, dull, achy, sharp, shooting, annoying  Other:     Objective  Modalities:   Precautions: s/p (R) RTC repair (18 weeks post-op 2023) ; per patient 5# lifting restriction & push pull ; Dr. Nayely Hopkins small to medium protocol for a rotator cuff repair Phase V based on tolerance  Exercises:  Exercise Reps/ Time Weight/ Level Comments   UBE 2.5/2.5 L3 Forward/Retro   Pulleys - Self Assisted  3/3   Elevation/Scaption   Prone (R) shoulder extension  20 reps      Prone (R) shoulder rows  20 reps      Prone (R) shoulder horizontal abduction 15 reps x 2 set       Side lying (R) shoulder ER with towel 30 reps x 2 sets     Standing (R) shoulder AROM  10 reps x 2 sets

## 2023-09-20 ENCOUNTER — HOSPITAL ENCOUNTER (OUTPATIENT)
Dept: PHYSICAL THERAPY | Age: 58
Setting detail: THERAPIES SERIES
Discharge: HOME | End: 2023-09-20
Payer: COMMERCIAL

## 2023-09-20 PROCEDURE — 97110 THERAPEUTIC EXERCISES: CPT

## 2023-09-25 ENCOUNTER — HOSPITAL ENCOUNTER (OUTPATIENT)
Dept: PHYSICAL THERAPY | Age: 58
Setting detail: THERAPIES SERIES
Discharge: HOME | End: 2023-09-25
Payer: COMMERCIAL

## 2023-09-25 PROCEDURE — 97110 THERAPEUTIC EXERCISES: CPT

## 2023-09-25 NOTE — FLOWSHEET NOTE
[facility]) [facility] & Therapy  [address]  Phone: [phone]  Fax: [phone]    Physical Therapy Daily Treatment Note    Date:  2023  Patient: Ephraim Griffin  : 1965  MRN: [id number]  Physician: Juan Honeycutt MD                                 Medical Diagnosis: (R) rotator cuff tear (M46.011)/Status post right rotator cuff repair (R15.677)         Rehab Codes: (R) shoulder pain (M25.511)  Onset date: 2023 (injury date) 2023 (surgery date)  Next Dr's appt.: 23  PT Visit Information  PT Insurance Information: Worker's Compensation: 3 x a week x 6 weeks 18 visits -authorized thru 2023  Total # of Visits Approved: 47  Total # of Visits to Date: 40  No Show: 0  Canceled Appointment: 2    Comments  New orders were received to continue physical therapy for an additional 6 weeks (3 x week). C-9 authorized thru 2023. Goals were updated to reflect the new order. Subjective  Patient stated that he has noticed significant tightness throughout all planes of the (R) shoulder along with his (R) upper trap region of his neck. He stated that it is painful to touch throughout the anterior region of the (R) shoulder and active motion(s) cause sharp bolt of pain at times over the last 24-48 hours.       Pain:  [x] Yes   [] No      Location:(R) shoulder anterior/superior/AC joint region   Over 24 hours   Pain Ratin/10  Worst: 6/20, with certain motions  Best: 0/10  Descriptors: intermittent, dull, achy, sharp, shooting, annoying  Other:     Objective  Modalities:   Precautions: s/p (R) RTC repair (20 weeks post-op 2023) ; per patient 5# lifting restriction & push pull ; Dr. Francisco Favors small to medium protocol for a rotator cuff repair Phase V based on tolerance  Exercises:  Exercise Reps/ Time Weight/ Level Comments   UBE 2.5/2.5 3.0     Self Assisted Pulleys  3/3 minutes    Flexion/Scaption      Passive Stretching   Supine (R)

## 2023-09-29 ENCOUNTER — HOSPITAL ENCOUNTER (OUTPATIENT)
Dept: PHYSICAL THERAPY | Age: 58
Setting detail: THERAPIES SERIES
Discharge: HOME | End: 2023-09-29
Payer: COMMERCIAL

## 2023-09-29 PROCEDURE — 97110 THERAPEUTIC EXERCISES: CPT

## 2023-09-29 NOTE — FLOWSHEET NOTE
Continue per plan of care/Dr. Orozco's Small to Medium RTC Protocol   [] Other:      Treatment Charges: Mins Units Time In/Out   [] Evaluation       []  Low       []  Moderate       []  High      []  Modalities        [x]  Ther Exercise 43 3 3966-0836   []  Neuromuscular Re-ed      []  Gait Training      []  Manual Therapy      []  Ther Activities      []  Aquatics      []  Vasocompression      []  Cervical Traction      []  Other      Total Treatment time 43 3        Time In: 1000  Time Out: 1092    Electronically signed by:  Doug Luna, SPT

## 2023-10-02 ENCOUNTER — HOSPITAL ENCOUNTER (OUTPATIENT)
Dept: PHYSICAL THERAPY | Age: 58
Setting detail: THERAPIES SERIES
Discharge: HOME | End: 2023-10-02
Payer: COMMERCIAL

## 2023-10-02 PROCEDURE — 97110 THERAPEUTIC EXERCISES: CPT

## 2023-10-02 NOTE — FLOWSHEET NOTE
[facility]) - [facility] & Therapy  [address]  Phone: [phone]  Fax: [phone]    Physical Therapy Daily Treatment Note    Date:  10/2/2023  Patient: Roc Elias  : 1965  MRN: [id number]  Physician: Jo-Ann Bedolla MD                                 Medical Diagnosis: (R) rotator cuff tear (M46.011)/Status post right rotator cuff repair (U65.846)         Rehab Codes: (R) shoulder pain (M25.511)  Onset date: 2023 (injury date) 2023 (surgery date)  Next Dr's appt.: 23  PT Visit Information  PT Insurance Information: Worker's Compensation: 3 x a week x 6 weeks 18 visits -authorized thru 2023  Total # of Visits Approved: 54  Total # of Visits to Date: 39  No Show: 0  Canceled Appointment: 2    Subjective  Patient continues to report pain with ER and elevating his RUE.     Pain:  [x] Yes   [] No      Location:(R) shoulder anterior/superior/AC joint region   Over 24 hours   Pain Ratin/10  Worst: 0/31, with certain motions  Best: 0/10  Descriptors: intermittent, dull, achy, sharp, shooting, annoying  Other:     Objective  Modalities:   Precautions: s/p (R) RTC repair (21 weeks post-op 2023) ; per patient 5# lifting restriction & push pull ; Dr. Angela Mortensen small to medium protocol for a rotator cuff repair Phase V based on tolerance  Exercises:  Exercise Reps/ Time Weight/ Level Comments   UBE 2.5/2.5 3.0     Self Assisted Pulleys  3/3 minutes    Flexion/Scaption    Prone (R) Shoulder Extension 10 reps x  3 sets     Prone (R) Shoulder Rows 10 reps x 3 sets     Prone (R) shoulder horizontal abduction  10 reps x 3 sets     Standing bicep stretch 3x 30\"     Standing shoulder IR stretch 3x 30\"  Pain discomfort noted in superior shoulder     Passive Stretching   Supine (R) shoulder flexion 30 sec hold x 3 reps  Supine (R) shoulder abduction 30 sec hold x 3 reps   Supine (R) shoulder ER @ 90 30 sec hold x 3 reps  Supine (R) shoulder

## 2023-10-04 ENCOUNTER — HOSPITAL ENCOUNTER (OUTPATIENT)
Dept: PHYSICAL THERAPY | Age: 58
Setting detail: THERAPIES SERIES
Discharge: HOME | End: 2023-10-04
Payer: COMMERCIAL

## 2023-10-04 PROCEDURE — 97110 THERAPEUTIC EXERCISES: CPT

## 2023-10-04 NOTE — FLOWSHEET NOTE
motion(s), progress (R) shoulder GH and scapulothoracic strengthening      Treatment Charges: Mins Units Time In/Out   [] Evaluation       []  Low       []  Moderate       []  High      []  Modalities        [x]  Ther Exercise 60 4 8010 -1402   []  Neuromuscular Re-ed      []  Gait Training      []  Manual Therapy      []  Ther Activities      []  Aquatics      []  Vasocompression      []  Cervical Traction      []  Other      Total Treatment time 60 4 0948 -8785       Time In: 4400  Time Out: 5087    Electronically signed by:  Felecia Merlos PT DPT

## 2023-10-06 ENCOUNTER — HOSPITAL ENCOUNTER (OUTPATIENT)
Dept: PHYSICAL THERAPY | Age: 58
Setting detail: THERAPIES SERIES
Discharge: HOME | End: 2023-10-06
Payer: COMMERCIAL

## 2023-10-06 PROCEDURE — 97110 THERAPEUTIC EXERCISES: CPT

## 2023-10-06 NOTE — FLOWSHEET NOTE
[facility]) [facility] & Therapy  [address]  Phone: [phone]  Fax: [phone]    Physical Therapy Daily Treatment Note    Date:  10/6/2023  Patient: Letitia Galarza  : 1965  MRN: [id number]  Physician: Lance Nicholas MD                                 Medical Diagnosis: (R) rotator cuff tear (M46.011)/Status post right rotator cuff repair (Z98.890)         Rehab Codes: (R) shoulder pain (M25.511)  Onset date: 2023 (injury date) 2023 (surgery date)  Next Dr's appt.: 23  PT Visit Information  PT Insurance Information: Worker's Compensation: 3 x a week x 6 weeks 18 visits -authorized thru 2023  Total # of Visits Approved: 54  Total # of Visits to Date: 39  No Show: 0  Canceled Appointment: 2    Subjective  Patient reports 3/10 in the (R) shoulder upon arrival. Patient notes that his (R) arm is overall \"having a bad day\" as he has pain radiating down his (R) arm. He mentions that he has been stretching at home. Patient notes that he felt good after his previous session.      Pain:  [x] Yes   [] No      Location:(R) shoulder anterior/superior/AC joint region   Over 24 hours   Pain Rating: 3/10  Worst: 2/43, with certain motions  Best: 0/10  Descriptors: intermittent, dull, achy, sharp, shooting, annoying  Other:     Objective  Modalities:   Precautions: s/p (R) RTC repair (22 weeks post-op 10/06/2023) ; per patient 5# lifting restriction & push pull ; Dr. Savanah Jama small to medium protocol for a rotator cuff repair Phase V based on tolerance  Exercises:  Exercise Reps/ Time Weight/ Level Comments   UBE 2.5/2.5 3.0     Self Assisted Pulleys  3/3 minutes    Flexion/Scaption    Supine (R) shoulder protraction  10 reps x 3 sets  2 lbs     Supine (R) shoulder X's and Os 10 reps x 2 sets  2 lbs     Supine (R) Triceps Curls  10 reps x 3 sets  8 lbs    Seated (R) Bicep Curls  10 reps x 3 sets  8 lbs    Standing Shoulder Shrugs  10 reps x 3 sets  23

## 2023-10-16 ENCOUNTER — HOSPITAL ENCOUNTER (OUTPATIENT)
Dept: PHYSICAL THERAPY | Age: 58
Setting detail: THERAPIES SERIES
Discharge: HOME | End: 2023-10-16
Payer: COMMERCIAL

## 2023-10-16 PROCEDURE — 97110 THERAPEUTIC EXERCISES: CPT

## 2023-10-16 NOTE — FLOWSHEET NOTE
(R) Shoulder Rows 10 reps x 3 sets     Prone (R) shoulder horizontal abduction  10 reps x 3 sets     Passive Stretching   Supine (R) shoulder flexion 30 sec hold x 3 reps  Supine (R) shoulder abduction 30 sec hold x 3 reps   Supine (R) shoulder ER @ 90 30 sec hold x 3 reps  Supine (R) shoulder posterior capsule stretch 30 sec hold x 3 reps  Supine (R) shoulder IR @ 90 30 sec hold x 3 reps     Pt. Education:  [] Yes  [x] No  [] Reviewed Prior HEP/Ed  Method of Education: [] Verbal  [] Demo  [] Written  Comprehension of Education:  [] Verbalizes understanding. [] Demonstrates understanding. [] Needs review. [] Demonstrates/verbalizes HEP/Ed previously given. Assessment  Continued with exercise to include passive stretching. Full PROM was noted within all planes of (R) GH joint with pain at end ranges. Goals  STG: (to be met in 45 treatments)  Patient will report an average pain level of 1-2/10 within the (R) shoulder at rest/performing his ADLs. Patient will demonstrate full PROM within all involved planes of the (R) shoulder to assist with (I) function without limitation. LTG: (to be met in 54 treatments)  No complaints of pain will be reported within the (R) shoulder at rest/with ADLs. Patient demonstrate 5/5 MMT within all planes of the (R) GH joint to assist with (I) function without limitation. Patient will demonstrate independence with his home exercise program at discharge. Patient goals: To return to the (R) shoulder to full function without limitation.      Plan: [x] Continue per plan of care/Dr. Orozco's Small to Medium RTC Protocol   [x] Other: Continue to address limited shoulder motion(s), progress (R) shoulder GH and scapulothoracic strengthening      Treatment Charges: Mins Units Time In/Out   [] Evaluation       []  Low       []  Moderate       []  High      []  Modalities        [x]  Ther Exercise 60 1 5987 -3433   []  Neuromuscular Re-ed      []  Gait Training      []

## 2023-10-18 ENCOUNTER — HOSPITAL ENCOUNTER (OUTPATIENT)
Dept: PHYSICAL THERAPY | Age: 58
Setting detail: THERAPIES SERIES
Discharge: HOME | End: 2023-10-18
Payer: COMMERCIAL

## 2023-10-18 NOTE — FLOWSHEET NOTE
[x] [address] [facility] Therapy  [address]  [place]: [phone]   [place]: [phone]     Physical Therapy Cancel/No Show note    Date: 10/18/2023  Patient: Gillian Gaytan  : 1965  MRN: [id number]    Visit Count: 42/54  Cancels/No Shows to date: 3/0    For today's appointment patient:    [x]  Cancelled    [] Rescheduled appointment    [] No-show     Reason given by patient:    []  Patient ill    []  Conflicting appointment    [] No transportation      [] Conflict with work    [x] No reason given    [] Weather related    [] YCOJO-    [x] Other:      Comments:  per , pt called to cancel his appt with no reason provided      [] Next appointment was confirmed    Electronically signed by: Chapo Castro, PTA

## 2023-10-20 ENCOUNTER — HOSPITAL ENCOUNTER (OUTPATIENT)
Dept: PHYSICAL THERAPY | Age: 58
Setting detail: THERAPIES SERIES
Discharge: HOME | End: 2023-10-20
Payer: COMMERCIAL

## 2023-10-20 PROCEDURE — 97110 THERAPEUTIC EXERCISES: CPT

## 2023-10-25 ENCOUNTER — HOSPITAL ENCOUNTER (OUTPATIENT)
Dept: PHYSICAL THERAPY | Age: 58
Setting detail: THERAPIES SERIES
Discharge: HOME | End: 2023-10-25
Payer: COMMERCIAL

## 2023-10-25 PROCEDURE — 97110 THERAPEUTIC EXERCISES: CPT

## 2023-10-25 NOTE — FLOWSHEET NOTE
[facility]) - [facility] Therapy  [address]  Phone: [phone]  Fax: [phone]    Physical Therapy Daily Treatment Note    Date:  10/25/2023  Patient: Altaf Peñaloza  : 1965  MRN: [id number]  Physician: Kaushik Quick MD                                 Medical Diagnosis: (R) rotator cuff tear (M46.011)/Status post right rotator cuff repair (Z98.890)         Rehab Codes: (R) shoulder pain (M25.511)  Onset date: 2023 (injury date) 2023 (surgery date)  Next Dr's appt.: TBD  PT Visit Information  PT Insurance Information: Worker's Compensation: 3 x a week x 6 weeks 18 visits -authorized thru 2023  Total # of Visits Approved: 47  Total # of Visits to Date: 40  No Show: 0  Canceled Appointment: 3    Subjective  Patient states minimal to no pain upon arrival.  States ROM has greatly improved over the past few days. Notes stretching and working on ROM at home with improving IR but still limited to mid low back/L2. Denies any issues after last therapy visit. Pain:  [x] Yes   [] No      Location:(R) shoulder anterior/superior/AC joint region   Over 24 hours   Pain Ratin/10  Worst: 9/82, with certain motions (ADLs that involve ER, horizontal abduction)   Best: 0/10  Descriptors: intermittent, dull, achy, sharp, shooting, annoying  Other:     Objective  Modalities:   Precautions: s/p (R) RTC repair (25 weeks post-op 10/24/2023) ;  Dr. Ambrosio Becerril small to medium protocol for a rotator cuff repair Phase VI based on tolerance  Exercises:  Exercise Reps/ Time Weight/ Level Comments   UBE 2.5/2.5 3.0     IR stretch with pulleys 20x     Supine (R) shoulder protraction  10 reps x 3 sets  8 lbs     Supine (R) shoulder X's and Os 10 reps x 2 sets  8 lbs     Supine (R) Triceps Curls  10 reps x 3 sets  8 lbs    Seated (R) Bicep Curls neutral 10 reps x 2 sets  8 lbs    Seated (R) Bicep Curls supinated  10 reps x 2 sets 8 lbs    Standing Shoulder Shrugs

## 2023-10-27 ENCOUNTER — HOSPITAL ENCOUNTER (OUTPATIENT)
Dept: PHYSICAL THERAPY | Age: 58
Setting detail: THERAPIES SERIES
Discharge: HOME | End: 2023-10-27
Payer: COMMERCIAL

## 2023-10-27 NOTE — FLOWSHEET NOTE
[] [facility]) - [facility] & Therapy  [address]  [place]: [phone]   F: [phone]     Physical Therapy Cancel/No Show note    Date: 10/27/2023  Patient: Ephraim Griffin  : 1965  MRN: [id number]    Visit Count: 44/54  Cancels/No Shows to date:     For today's appointment patient:    [x]  Cancelled    [] Rescheduled appointment    [] No-show     Reason given by patient:    []  Patient ill    []  Conflicting appointment    [] No transportation      [] Conflict with work    [x] No reason given    [] Weather related    [] COVID-19    [] Other:      Comments:        [x] Next appointment was confirmed    Electronically signed by: Sabine Oliveira PTA

## 2023-11-01 ENCOUNTER — HOSPITAL ENCOUNTER (OUTPATIENT)
Dept: PHYSICAL THERAPY | Age: 58
Setting detail: THERAPIES SERIES
Discharge: HOME | End: 2023-11-01
Payer: COMMERCIAL

## 2023-11-01 PROCEDURE — 97110 THERAPEUTIC EXERCISES: CPT

## 2023-11-01 NOTE — FLOWSHEET NOTE
[facility]) [facility] Therapy  [address]  Phone: [phone]  Fax: [phone]    Physical Therapy Daily Treatment Note    Date:  2023  Patient: Yamini Young  : 1965  MRN: [id number]  Physician: Sandra Rodas MD                                 Medical Diagnosis: (R) rotator cuff tear (M46.011)/Status post right rotator cuff repair (Z98.890)         Rehab Codes: (R) shoulder pain (M25.511)  Onset date: 2023 (injury date) 2023 (surgery date)  Next Dr's appt.: TBD  PT Visit Information  PT Insurance Information: Worker's Compensation: 3 x a week x 6 weeks 18 visits -authorized thru 2023  Total # of Visits Approved: 54  Total # of Visits to Date: 45  No Show: 0  Canceled Appointment: 3    Subjective  : patient arrived and reports no pain. Patient states he only gets pain at end range elevating into ER motion. Pain:  [x] Yes   [] No      Location:(R) shoulder anterior/superior/AC joint region   Over 24 hours   Pain Ratin/10  Worst: 2/06, with certain motions (ADLs that involve ER, horizontal abduction)   Best: 0/10  Descriptors: intermittent, dull, achy, sharp, shooting, annoying  Other:     Objective  Modalities:   Precautions: s/p (R) RTC repair (26 weeks post-op 10/31/2023) ;  Dr. Varghese Basilio small to medium protocol for a rotator cuff repair Phase VI based on tolerance  Exercises:  Exercise Reps/ Time Weight/ Level Comments   UBE 2.5/2.5 3.0     IR stretch with pulleys 20x     Supine (R) shoulder protraction  10 reps x 3 sets  8 lbs     Supine (R) shoulder X's and Os 10 reps x 2 sets  8 lbs     Supine (R) Triceps Curls  10 reps x 3 sets  8 lbs    Seated (R) Bicep Curls neutral 10 reps x 2 sets  8 lbs    Seated (R) Bicep Curls supinated  10 reps x 2 sets 8 lbs    Standing Shoulder Shrugs  10 reps x 3 sets  29 lbs     Seated Rows  10 reps x 3 sets  45 lbs     Seated (R) shoulder active flexion 10 reps x 3 sets      Prone (R)

## 2023-11-03 ENCOUNTER — HOSPITAL ENCOUNTER (OUTPATIENT)
Dept: PHYSICAL THERAPY | Age: 58
Setting detail: THERAPIES SERIES
Discharge: HOME | End: 2023-11-03
Payer: COMMERCIAL

## 2023-11-03 PROCEDURE — 97110 THERAPEUTIC EXERCISES: CPT

## 2023-11-03 NOTE — FLOWSHEET NOTE
[facility]) [facility] & Therapy  [address]  Phone: [phone]  Fax: [phone]    Physical Therapy Daily Treatment Note    Date:  11/3/2023  Patient: Altaf Peñaloza  : 1965  MRN: [id number]  Physician: Kaushik Quick MD                                 Medical Diagnosis: (R) rotator cuff tear (M46.011)/Status post right rotator cuff repair (Z98.890)         Rehab Codes: (R) shoulder pain (M25.511)  Onset date: 2023 (injury date) 2023 (surgery date)  Next Dr's appt.: TBD  PT Visit Information  PT Insurance Information: Worker's Compensation: 3 x a week x 6 weeks 18 visits -authorized thru 2023  Total # of Visits Approved: 54  Total # of Visits to Date: 55  No Show: 0  Canceled Appointment: 3    Subjective  11/3 Patient arrived and reports he \"tweaked\" his back earlier this week and the pain is starting to become worse making it difficult to walk,lay down, and sit. Patient states shoulder pain is non existent at this time. Pain:  [x] Yes   [] No      Location:(R) shoulder anterior/superior/AC joint region , Lower lumbar 6-7/10 spastic pain   Over 24 hours   Pain Ratin/10  Worst: 9/59, with certain motions (ADLs that involve ER, horizontal abduction)   Best: 0/10  Descriptors: intermittent, dull, achy, sharp, shooting, annoying  Other:     Objective  Modalities:   Precautions: s/p (R) RTC repair (26 weeks post-op 10/31/2023) ;  Dr. Ambrosio Becerril small to medium protocol for a rotator cuff repair Phase VI based on tolerance  Exercises:  Exercise Reps/ Time Weight/ Level Comments   UBE 2.5/2.5 3.0     IR stretch with pulleys 20x     Supine (R) shoulder protraction  10 reps x 3 sets  8 lbs     Supine (R) shoulder X's and Os 10 reps x 2 sets  8 lbs     Supine (R) Triceps Curls  10 reps x 3 sets  8 lbs    Seated (R) Bicep Curls neutral 10 reps x 2 sets  8 lbs    Seated (R) Bicep Curls supinated  10 reps x 2 sets 8 lbs    Seated (R) shoulder

## 2023-11-06 ENCOUNTER — APPOINTMENT (OUTPATIENT)
Dept: PHYSICAL THERAPY | Age: 58
End: 2023-11-06
Payer: COMMERCIAL

## 2023-11-08 ENCOUNTER — HOSPITAL ENCOUNTER (OUTPATIENT)
Dept: PHYSICAL THERAPY | Age: 58
Setting detail: THERAPIES SERIES
Discharge: HOME | End: 2023-11-08
Payer: COMMERCIAL

## 2023-11-08 PROCEDURE — 97110 THERAPEUTIC EXERCISES: CPT

## 2023-11-08 NOTE — FLOWSHEET NOTE
[facility]) [facility] Therapy  [address]  Phone: [phone]  Fax: [phone]    Physical Therapy Daily Treatment Note    Date:  2023  Patient: Gillian Gaytan  : 1965  MRN: [id number]  Physician: Jae Luna MD                                 Medical Diagnosis: (R) rotator cuff tear (M46.011)/Status post right rotator cuff repair (Z98.890)         Rehab Codes: (R) shoulder pain (M25.511)  Onset date: 2023 (injury date) 2023 (surgery date)  Next Dr's appt.: TBD  PT Visit Information  PT Insurance Information: Worker's Compensation: 3 x a week x 6 weeks 18 visits -authorized thru 2023  Total # of Visits Approved: 54  Total # of Visits to Date: 55  No Show: 0  Canceled Appointment: 3    Subjective  : Patient arrived and reports his lower back pain has resolved since last session. Patient states he is now just overall sore in entire body including R shoulder due to raking leaves. Pain:  [x] Yes   [] No      Location:(R) shoulder anterior/superior/AC joint region   Over 24 hours   Pain Ratin/10  Worst: 4/59, with certain motions (ADLs that involve ER, horizontal abduction)   Best: 0/10  Descriptors: intermittent, dull, achy, sharp, shooting, annoying  Other:     Objective  Modalities:   Precautions: s/p (R) RTC repair (27 weeks post-op 2023) ;  Dr. Eliza Arango small to medium protocol for a rotator cuff repair Phase VI based on tolerance  Exercises:  Exercise Reps/ Time Weight/ Level Comments   UBE 2.5/2.5 3.0     IR stretch with pulleys 20x     Supine (R) shoulder protraction  10 reps x 3 sets  8 lbs     Supine (R) shoulder X's and Os 10 reps x 2 sets  8 lbs     Supine (R) Triceps Curls  10 reps x 3 sets  8 lbs    Seated (R) Bicep Curls neutral 10 reps x 2 sets  8 lbs    Seated (R) Bicep Curls supinated  10 reps x 2 sets 8 lbs    Standing Shoulder Shrugs  10 reps x 3 sets  30 lbs  Resumed     Seated Rows  10 reps x 3

## 2023-11-10 ENCOUNTER — HOSPITAL ENCOUNTER (OUTPATIENT)
Dept: PHYSICAL THERAPY | Age: 58
Setting detail: THERAPIES SERIES
Discharge: HOME | End: 2023-11-10
Payer: COMMERCIAL

## 2023-11-10 PROCEDURE — 97110 THERAPEUTIC EXERCISES: CPT

## 2023-11-10 NOTE — FLOWSHEET NOTE
[]  Aquatics      []  Vasocompression      []  Cervical Traction      []  Other      Total Treatment time 40 3 [id number]       Time In: 7721   Time Out: 9830    Electronically signed by:  Jean-Paul Grant PTA

## 2023-11-13 ENCOUNTER — HOSPITAL ENCOUNTER (OUTPATIENT)
Dept: PHYSICAL THERAPY | Age: 58
Setting detail: THERAPIES SERIES
Discharge: HOME | End: 2023-11-13
Payer: COMMERCIAL

## 2023-11-13 PROCEDURE — 97110 THERAPEUTIC EXERCISES: CPT

## 2023-11-13 NOTE — FLOWSHEET NOTE
Vasocompression      []  Cervical Traction      []  Other      Total Treatment time 38 3        Time In: 2575   Time Out: 1852    Electronically signed by:  Dash Cooper PTA

## 2023-11-15 ENCOUNTER — HOSPITAL ENCOUNTER (OUTPATIENT)
Dept: PHYSICAL THERAPY | Age: 58
Setting detail: THERAPIES SERIES
Discharge: HOME | End: 2023-11-15
Payer: COMMERCIAL

## 2023-11-15 PROCEDURE — 97110 THERAPEUTIC EXERCISES: CPT

## 2023-11-17 ENCOUNTER — HOSPITAL ENCOUNTER (OUTPATIENT)
Dept: PHYSICAL THERAPY | Age: 58
Setting detail: THERAPIES SERIES
Discharge: HOME | End: 2023-11-17
Payer: COMMERCIAL

## 2023-11-17 PROCEDURE — 97110 THERAPEUTIC EXERCISES: CPT

## 2023-11-17 NOTE — FLOWSHEET NOTE
[x] [facility]) - [facility] Therapy  [address]  [place]: [phone]   F: [phone]    Physical Therapy Daily Treatment Note      Date:  2023  Patient Name:  Fay Kahn    :  1965  MRN: [id number]  Physician: Lilia Shah MD                                 Medical Diagnosis: (R) rotator cuff tear (M46.011)/Status post right rotator cuff repair (M99.840)         Rehab Codes: (R) shoulder pain (M25.511)  Onset date: 2023 (injury date) 2023 (surgery date)  Next Dr's appt.: TBD  PT Visit Information  PT Insurance Information: Worker's Compensation: 3 x a week x 6 weeks 18 visits -authorized thru 2023     Total # of Visits Approved: 54  Total # of Visits to Date: 46  No Show: 0  Canceled Appointment: 4       Subjective:    : Patient arrived and has no new complaints. Pain:  [] Yes  [x] No Location: R shoulder Pain Rating: (0-10 scale) denies/10  Pain altered Tx:  [] No  [] Yes  Action:    Objective:  Modalities:   Precautions: s/p (R) RTC repair (28 weeks post-op 2023) ;  Dr. Vimal De Leon small to medium protocol for a rotator cuff repair Phase VI based on tolerance  Exercises:  Exercise Reps/ Time Weight/ Level Comments   UBE 2.5/2.5 3.0     IR stretch with pulleys 20 x       Supine (R) shoulder protraction  10 reps x 3 sets  8 lbs      Supine (R) shoulder X's and Os 10 reps x 2 sets  8 lbs      Supine (R) Triceps Curls  10 reps x 3 sets  10 lbs     Seated (R) Bicep Curls neutral 10 reps x 2 sets  8 lbs     Seated (R) Bicep Curls supinated  10 reps x 2 sets 8 lbs     Standing Shoulder Shrugs  10 reps x 3 sets  30 lbs      Seated Rows  10 reps x 3 sets  65 lbs      Seated (R) shoulder active flexion 10 reps x 3 sets        Prone (R) Shoulder Extension 10 reps x  2 sets #2     Prone (R) shoulder flexion  10 reps x 3 sets #2     Prone (R) Shoulder Rows 10 reps x 3 sets #2     Prone (R) shoulder horizontal abduction  10 reps x 3 sets #2

## 2023-11-20 ENCOUNTER — HOSPITAL ENCOUNTER (OUTPATIENT)
Dept: PHYSICAL THERAPY | Age: 58
Setting detail: THERAPIES SERIES
Discharge: HOME | End: 2023-11-20
Payer: COMMERCIAL

## 2023-11-20 PROCEDURE — 97110 THERAPEUTIC EXERCISES: CPT

## 2023-11-20 NOTE — FLOWSHEET NOTE
[x] [facility] Therapy  [address]  [place]: [phone]   F: [phone]    Physical Therapy Daily Treatment Note    Date:  2023  Patient Name:  Ephraim Griffin    :  1965  MRN: [id number]  Physician: Juan Honeycutt MD                                 Medical Diagnosis: (R) rotator cuff tear (M46.011)/Status post right rotator cuff repair (Z98.890)         Rehab Codes: (R) shoulder pain (M25.511)  Onset date: 2023 (injury date) 2023 (surgery date)  Next Dr's appt.: TBD  PT Visit Information  PT Insurance Information: Worker's Compensation: 3 x a week x 6 weeks 18 visits -authorized thru 2023     Total # of Visits Approved: 47  Total # of Visits to Date: 46  No Show: 0  Canceled Appointment: 4       Subjective:  Pt reports increased pain in anterior and superior shoulder since last visit. Notes did nothing different except reaching behind back with IR exercise. Pain:  [x] Yes  [] No Location: R shoulder Pain Rating: (0-10 scale) 2-3/10  Pain altered Tx:  [x] No  [] Yes  Action:    Objective:  Modalities:   Precautions: s/p (R) RTC repair (28 weeks post-op 2023) ;  Dr. Francisco Favors small to medium protocol for a rotator cuff repair Phase VI based on tolerance  Exercises:  Exercise Reps/ Time Weight/ Level Comments   UBE 2.5/2.5 3.0     IR stretch with pulleys 20 x       Supine (R) shoulder protraction  10 reps x 3 sets  8 lbs      Supine (R) shoulder X's and Os 10 reps x 2 sets  8 lbs      Supine (R) Triceps Curls  10 reps x 3 sets  8 lbs     Seated (R) Bicep Curls neutral 10 reps x 2 sets  8 lbs     Seated (R) Bicep Curls supinated  10 reps x 2 sets 8 lbs     Standing Shoulder Shrugs  10 reps x 3 sets  30 lbs      Seated Rows  10 reps x 3 sets  55 lbs   decreased to 55 lbs   Seated (R) shoulder active flexion 10 reps x 3 sets        Prone (R) Shoulder Extension 10 reps x  2 sets #2     Prone (R) shoulder flexion  10 reps x 3 sets

## 2023-11-22 ENCOUNTER — HOSPITAL ENCOUNTER (OUTPATIENT)
Dept: PHYSICAL THERAPY | Age: 58
Setting detail: THERAPIES SERIES
Discharge: HOME | End: 2023-11-22
Payer: COMMERCIAL

## 2023-11-22 NOTE — FLOWSHEET NOTE
[] [facility]  [address]  [place]: [phone]   F: [phone]     Physical Therapy Cancel/No Show note    Date: 2023  Patient: Maury Sparks  : 1965  MRN: [id number]    Visit Count: 5254  Cancels/No Shows to date:     For today's appointment patient:    [x]  Cancelled    [] Rescheduled appointment    [] No-show     Reason given by patient:    []  Patient ill    []  Conflicting appointment    [] No transportation      [] Conflict with work    [x] No reason given    [] Weather related    [] [id number]    [] Other:      Comments:        [] Next appointment was confirmed    Electronically signed by: Pam Cameron PT DPT

## 2023-11-27 ENCOUNTER — HOSPITAL ENCOUNTER (OUTPATIENT)
Dept: PHYSICAL THERAPY | Age: 58
Setting detail: THERAPIES SERIES
Discharge: HOME | End: 2023-11-27
Payer: COMMERCIAL

## 2023-11-27 PROCEDURE — 97110 THERAPEUTIC EXERCISES: CPT

## 2023-11-27 NOTE — FLOWSHEET NOTE
[x] [facility]) - [facility] Therapy  [address]  [place]: [phone]   F: [phone]    Physical Therapy Daily Treatment Note    Date:  2023  Patient Name:  Louie Minaya    :  1965  MRN: [id number]  Physician: Pee Taveras MD                                 Medical Diagnosis: (R) rotator cuff tear (M46.011)/Status post right rotator cuff repair (Z98.890)         Rehab Codes: (R) shoulder pain (M25.511)  Onset date: 2023 (injury date) 2023 (surgery date)  Next Dr's appt.: TBD  PT Visit Information  PT Insurance Information: Worker's Compensation: 3 x a week x 6 weeks 18 visits -authorized thru 2023     Total # of Visits Approved: 47  Total # of Visits to Date: 48  No Show: 0  Canceled Appointment: 4     Subjective:  Pt reports no issues after last therapy session. States shoulder feeling very tight this morning due to sleeping for 13 hours because of not feeling well. Pain:  [] Yes  [x] No Location: R shoulder Pain Rating: (0-10 scale) 0/10  Pain altered Tx:  [x] No  [] Yes  Action:    Objective:  Modalities:   Precautions: s/p (R) RTC repair (29 weeks post-op 2023) ;  Dr. Lucinda Muniz small to medium protocol for a rotator cuff repair Phase VI based on tolerance  Exercises:  Exercise Reps/ Time Weight/ Level Comments   UBE 2.5/2.5 3.0     IR stretch with pulleys 20 x       Supine (R) shoulder protraction  10 reps x 3 sets  8 lbs      Supine (R) shoulder X's and Os 10 reps x 2 sets  8 lbs      Supine (R) Triceps Curls  10 reps x 3 sets  10 lbs  inceased to 10lbs    Seated (R) Bicep Curls neutral 10 reps x 2 sets  8 lbs     Seated (R) Bicep Curls supinated  10 reps x 2 sets 8 lbs     Standing Shoulder Shrugs  10 reps x 3 sets  33 lbs   increased to 33 lbs   Seated Rows  10 reps x 3 sets  55 lbs      Seated (R) shoulder active flexion 10 reps x 3 sets        Prone (R) Shoulder Extension 10 reps x  2 sets #2     Prone (R) shoulder

## 2023-11-30 ENCOUNTER — HOSPITAL ENCOUNTER (OUTPATIENT)
Dept: PHYSICAL THERAPY | Age: 58
Setting detail: THERAPIES SERIES
Discharge: HOME | End: 2023-11-30
Payer: COMMERCIAL

## 2023-11-30 PROCEDURE — 97110 THERAPEUTIC EXERCISES: CPT

## 2023-11-30 NOTE — PROGRESS NOTES
[facility]  [address]         Phone: [phone]       Fax: [phone]    Physical Therapy Progress Note Update/Discharge Summary    Date:  2023  Patient: Rudolph Lomax  : 1965  MRN: [id number]  Physician: Ella Alejo MD                                 Medical Diagnosis: (R) rotator cuff tear (M46.011)/Status post right rotator cuff repair (G70.854)         Rehab Codes: (R) shoulder pain (M25.511)  Onset date: 2023 (injury date) 2023 (surgery date)  Next Dr's appt.: TBD  PT Visit Information  PT Insurance Information: Worker's Compensation: 3 x a week x 6 weeks 18 visits -authorized thru 2023     Total # of Visits Approved: 54  Total # of Visits to Date: 47  No Show: 0  Canceled Appointment: 4     Subjective  Patient stated that his (R) shoulder is fine. Stiffness/tightness is apparent within the anterior region of the (R) shoulder. Patient is pain free at rest with very mild pain with his ADLs at times. Pain  Pain:  [x] Yes   [] No       Location: (R) shoulder anterior/posterior and AC joint region(s). Pain Rating: (0-10 scale) 0/10   Worst: 1/10  Best: 0/10  Descriptors: intermittent, dull, achy, sharp, shooting, annoying  Aggravating factors:  Active movement of the (R) shoulder/ADLs involving ER/IR, horizontal abduction and diagonal movements (D1, D2)    Relieving factors: Rest, Repositioning   Sleep: Disturbed   Other:     Function  Hand Dominance  [x] Right  [] Left  Working:  [] Normal Duty  [] Light Duty  [] Off D/T Condition  [] Retired    [x] Not Employed    []  Disability  [] Other:           Return to work:   Job/ADL Description:     ADL/IADL Previous level of function Current level of function Who currently assists the patient with task/Comments   Bathing  [x] Independent  [] Assist [x] Independent  [] Assist    Dress/grooming [x] Independent  [] Assist [x] Independent  [] Assist

## 2024-03-11 ENCOUNTER — OFFICE VISIT (OUTPATIENT)
Dept: ORTHOPEDIC SURGERY | Age: 59
End: 2024-03-11
Payer: COMMERCIAL

## 2024-03-11 VITALS — BODY MASS INDEX: 32.2 KG/M2 | RESPIRATION RATE: 14 BRPM | HEIGHT: 71 IN | WEIGHT: 230 LBS

## 2024-03-11 DIAGNOSIS — Z98.890 STATUS POST RIGHT ROTATOR CUFF REPAIR: Primary | ICD-10-CM

## 2024-03-11 PROCEDURE — 99212 OFFICE O/P EST SF 10 MIN: CPT | Performed by: ORTHOPAEDIC SURGERY

## 2024-03-11 NOTE — PROGRESS NOTES
needed.  He can continue on with activities as he feels comfortable as well.  I explained that if his rotator cuff is healed he can see improvement up to a year out from surgery.  Should he have persistent symptoms he was instructed to contact me otherwise he may return or call at anytime with questions or concerns.

## (undated) DEVICE — BLANKET WRM W40.2XL55.9IN IORT LO BODY + MISTRAL AIR

## (undated) DEVICE — GLOVE ORANGE PI 7 1/2   MSG9075

## (undated) DEVICE — STRAP,POSITIONING,KNEE/BODY,FOAM,4X60": Brand: MEDLINE

## (undated) DEVICE — KIT CHAIR TRIMANO FOAM W/ SUPP ARM DRP ERGONOMICALLY DESIGNED

## (undated) DEVICE — DRAPE,U/ SHT,SPLIT,PLAS,STERIL: Brand: MEDLINE

## (undated) DEVICE — 1010 S-DRAPE TOWEL DRAPE 10/BX: Brand: STERI-DRAPE™

## (undated) DEVICE — PROBE ABLAT XL 90DEG ASPIR BPLR RF 1 PC ELECTRD ERGO HNDL

## (undated) DEVICE — BLADE SHV L13CM DIA4MM BNE CUT AGG DEB COOLCUT

## (undated) DEVICE — COVER,MAYO STAND,STERILE: Brand: MEDLINE

## (undated) DEVICE — ASPIRATOR FLR L72IN SUCT TB W/ 1 DETACH FISH STK PUSH HNDL

## (undated) DEVICE — KIT POS ULT SHLDR PT CARE REHAB SLNG

## (undated) DEVICE — APPLICATOR MEDICATED 26 CC SOLUTION HI LT ORNG CHLORAPREP

## (undated) DEVICE — DRESSING TRNSPAR W5XL4.5IN FLM SHT SEMIPERMEABLE WIND

## (undated) DEVICE — 3M™ STERI-DRAPE™ INSTRUMENT POUCH 1018L: Brand: STERI-DRAPE™

## (undated) DEVICE — TUBING PMP L16FT MAIN DISP FOR AR-6400 AR-6475

## (undated) DEVICE — GLOVE ORANGE PI 7   MSG9070

## (undated) DEVICE — SHEET, ORTHO, SPLIT, STERILE: Brand: MEDLINE

## (undated) DEVICE — SYRINGE MED 50ML LUERLOCK TIP

## (undated) DEVICE — DRAPE,REIN 53X77,STERILE: Brand: MEDLINE

## (undated) DEVICE — SUTURE PROL SZ 3-0 L18IN NONABSORBABLE BLU L30MM FS-1 3/8 8663G

## (undated) DEVICE — DRESSING,GAUZE,XEROFORM,CURAD,1"X8",ST: Brand: CURAD

## (undated) DEVICE — SOLUTION IRRIG 3000ML LAC RINGERS ARTHROMTC PLAS CONT

## (undated) DEVICE — TUBING, SUCTION, 9/32" X 20', STRAIGHT: Brand: MEDLINE INDUSTRIES, INC.

## (undated) DEVICE — Device

## (undated) DEVICE — MHPB ARTHROSCOPY PACK: Brand: MEDLINE INDUSTRIES, INC.

## (undated) DEVICE — CANNULA ARTHSCP L4CM DIA8MM PASSPRT BTTN

## (undated) DEVICE — POUCH FLD 36X29IN SHLDR HVY LO GLARE MATTE FINISH FLM 2 SUCT

## (undated) DEVICE — KIT DISP W/ SPEAR TRCR TIP OBT AND 2.6MM DRL FOR 2.6

## (undated) DEVICE — 3M™ STERI-DRAPE™ U-DRAPE 1015: Brand: STERI-DRAPE™